# Patient Record
Sex: MALE | Race: WHITE | Employment: FULL TIME | URBAN - METROPOLITAN AREA
[De-identification: names, ages, dates, MRNs, and addresses within clinical notes are randomized per-mention and may not be internally consistent; named-entity substitution may affect disease eponyms.]

---

## 2017-07-19 ENCOUNTER — LAB ONLY (OUTPATIENT)
Dept: INTERNAL MEDICINE CLINIC | Age: 62
End: 2017-07-19

## 2017-07-19 DIAGNOSIS — E11.9 TYPE 2 DIABETES MELLITUS WITHOUT COMPLICATION, WITHOUT LONG-TERM CURRENT USE OF INSULIN (HCC): Primary | ICD-10-CM

## 2017-07-20 LAB
ALT SERPL-CCNC: 33 U/L (ref 5–40)
ANION GAP SERPL CALC-SCNC: 19 MMOL/L
AST SERPL W P-5'-P-CCNC: 25 U/L (ref 10–37)
AVG GLU, 10930: 159 MG/DL (ref 91–123)
BUN SERPL-MCNC: 17 MG/DL (ref 6–22)
CALCIUM SERPL-MCNC: 9.3 MG/DL (ref 8.4–10.4)
CHLORIDE SERPL-SCNC: 99 MMOL/L (ref 98–110)
CHOLEST SERPL-MCNC: 132 MG/DL (ref 110–200)
CO2 SERPL-SCNC: 20 MMOL/L (ref 20–32)
CREAT SERPL-MCNC: 0.7 MG/DL (ref 0.8–1.6)
GFRAA, 66117: >60
GFRNA, 66118: >60
GLUCOSE SERPL-MCNC: 164 MG/DL (ref 65–99)
HBA1C MFR BLD HPLC: 7.2 % (ref 4.8–5.9)
HDLC SERPL-MCNC: 32 MG/DL (ref 40–59)
LDLC SERPL CALC-MCNC: 83 MG/DL (ref 50–99)
POTASSIUM SERPL-SCNC: 4.3 MMOL/L (ref 3.5–5.5)
SODIUM SERPL-SCNC: 138 MMOL/L (ref 133–145)
TRIGL SERPL-MCNC: 88 MG/DL (ref 40–149)
VLDLC SERPL CALC-MCNC: 18 MG/DL (ref 8–30)

## 2017-07-28 ENCOUNTER — OFFICE VISIT (OUTPATIENT)
Dept: INTERNAL MEDICINE CLINIC | Age: 62
End: 2017-07-28

## 2017-07-28 VITALS
HEIGHT: 72 IN | DIASTOLIC BLOOD PRESSURE: 80 MMHG | OXYGEN SATURATION: 96 % | RESPIRATION RATE: 16 BRPM | WEIGHT: 262 LBS | TEMPERATURE: 97.2 F | HEART RATE: 92 BPM | SYSTOLIC BLOOD PRESSURE: 132 MMHG | BODY MASS INDEX: 35.49 KG/M2

## 2017-07-28 DIAGNOSIS — Z00.00 ROUTINE GENERAL MEDICAL EXAMINATION AT A HEALTH CARE FACILITY: Primary | ICD-10-CM

## 2017-07-28 DIAGNOSIS — E78.2 MIXED HYPERLIPIDEMIA: ICD-10-CM

## 2017-07-28 DIAGNOSIS — I10 ESSENTIAL HYPERTENSION: ICD-10-CM

## 2017-07-28 DIAGNOSIS — E11.9 TYPE 2 DIABETES MELLITUS WITHOUT COMPLICATION, WITHOUT LONG-TERM CURRENT USE OF INSULIN (HCC): ICD-10-CM

## 2017-07-28 NOTE — PATIENT INSTRUCTIONS

## 2017-07-28 NOTE — PROGRESS NOTES
HPI:   routine check up  Hx notable for metabolic syndrome  Recent A1C/chol 7.2/132  w/o chest pain/abd. discomfort; no dyspnea, cough or pedal edema; denies constitutional complaints of fever, night sweats or wt loss; no evidence of GI/ hemorrhage; no polyuria/polydipsia. Activity is age appropriate. ROS is otherwise negative. Past Medical History:   Diagnosis Date    Diabetes ([address]     Hypercholesterolemia     Hypertension     PVC's (premature ventricular contractions)        Past Surgical History:   Procedure Laterality Date    HX COLONOSCOPY  2007    neg       Social History     Social History    Marital status:      Spouse name: N/A    Number of children: N/A    Years of education: N/A     Occupational History    car sales      Social History Main Topics    Smoking status: Never Smoker    Smokeless tobacco: Never Used    Alcohol use 0.0 oz/week     0 Standard drinks or equivalent per week    Drug use: No    Sexual activity: Not on file     Other Topics Concern    Not on file     Social History Narrative       No Known Allergies    Family History   Problem Relation Age of Onset    Heart Disease Father        Current Outpatient Prescriptions   Medication Sig Dispense Refill    atorvastatin (LIPITOR) 40 mg tablet Take 1 Tab by mouth daily. 90 Tab 3    amLODIPine-benazepril (LOTREL) 10-40 mg per capsule Take 1 Cap by mouth daily. 90 Cap 4    fluocinoNIDE (LIDEX) 0.05 % topical cream Apply tid prn leg rash 60 g 1    sitaGLIPtin-metFORMIN 50-1,000 mg TM24 1 bid 180 Tab 0           Visit Vitals    /80 (BP 1 Location: Left arm, BP Patient Position: Sitting)    Pulse 92    Temp 97.2 °F (36.2 °C) (Tympanic)    Resp 16    Ht 6' (1.829 m)    Wt 262 lb (118.8 kg)    SpO2 96%    BMI 35.53 kg/m2       PE  Well nourished in NAD  HEENT:   OP: clear. Neck: supple w/o mass or bruits. Chest: clear. CV: RRR w/o m,r,g; pulses intact. Abd: soft, NT, w/o HSM or mass.   Rectal: heme neg; prostate 3 FBS and smooth  Ext: w/o edema. Neuro: NF. Assessment and Plan    Encounter Diagnoses   Name Primary?  Routine general medical examination at a health care facility Yes    Type 2 diabetes mellitus without complication, without long-term current use of insulin ([facility]     Essential hypertension     Mixed hyperlipidemia    HTN - controlled  T2DM/hyperlipidemia - continue dietary/exercise efforts  [place] advised; up to date with eye exams  I have reviewed/discussed the above normal BMI with the patient. I have recommended the following interventions: dietary management education, guidance, and counseling . Frutoso Jayesh     No change in rx  OV 4 mos or prn  I have explained plan to patient and the patient verbalizes understanding

## 2017-07-28 NOTE — PROGRESS NOTES
1. Have you been to the ER, urgent care clinic since your last visit? Hospitalized since your last visit? No    2. Have you seen or consulted any other health care providers outside of the [address] [place] [address] since your last visit? Include any pap smears or colon screening.  No

## 2018-05-03 ENCOUNTER — LAB ONLY (OUTPATIENT)
Dept: INTERNAL MEDICINE CLINIC | Age: 63
End: 2018-05-03

## 2018-05-03 DIAGNOSIS — Z00.00 ROUTINE GENERAL MEDICAL EXAMINATION AT A HEALTH CARE FACILITY: ICD-10-CM

## 2018-05-03 DIAGNOSIS — E11.9 TYPE 2 DIABETES MELLITUS WITHOUT COMPLICATION, WITHOUT LONG-TERM CURRENT USE OF INSULIN (HCC): Primary | ICD-10-CM

## 2018-05-04 LAB
A-G RATIO,AGRAT: 1.9 RATIO (ref 1.1–2.6)
ABSOLUTE LYMPHOCYTE COUNT, 10803: 1.8 K/UL (ref 1–4.8)
ALBUMIN SERPL-MCNC: 4.2 G/DL (ref 3.5–5)
ALP SERPL-CCNC: 96 U/L (ref 40–125)
ALT SERPL-CCNC: 33 U/L (ref 5–40)
ANION GAP SERPL CALC-SCNC: 17 MMOL/L
AST SERPL W P-5'-P-CCNC: 30 U/L (ref 10–37)
AVG GLU, 10930: 169 MG/DL (ref 91–123)
BASOPHILS # BLD: 0.1 K/UL (ref 0–0.2)
BASOPHILS NFR BLD: 1 % (ref 0–2)
BILIRUB SERPL-MCNC: 0.6 MG/DL (ref 0.2–1.2)
BUN SERPL-MCNC: 12 MG/DL (ref 6–22)
CALCIUM SERPL-MCNC: 8.9 MG/DL (ref 8.4–10.4)
CHLORIDE SERPL-SCNC: 104 MMOL/L (ref 98–110)
CHOLEST SERPL-MCNC: 139 MG/DL (ref 110–200)
CO2 SERPL-SCNC: 22 MMOL/L (ref 20–32)
CREAT SERPL-MCNC: 0.7 MG/DL (ref 0.8–1.6)
CREATININE, URINE: 236 MG/DL
EOSINOPHIL # BLD: 0.9 K/UL (ref 0–0.5)
EOSINOPHIL NFR BLD: 10 % (ref 0–6)
ERYTHROCYTE [DISTWIDTH] IN BLOOD BY AUTOMATED COUNT: 13.9 % (ref 10–15.5)
GFRAA, 66117: >60
GFRNA, 66118: >60
GLOBULIN,GLOB: 2.2 G/DL (ref 2–4)
GLUCOSE SERPL-MCNC: 163 MG/DL (ref 70–99)
GRANULOCYTES,GRANS: 62 % (ref 40–75)
HBA1C MFR BLD HPLC: 7.5 % (ref 4.8–5.9)
HCT VFR BLD AUTO: 44.7 % (ref 39.3–51.6)
HDLC SERPL-MCNC: 3.5 MG/DL (ref 0–5)
HDLC SERPL-MCNC: 40 MG/DL (ref 40–59)
HGB BLD-MCNC: 14.7 G/DL (ref 13.1–17.2)
LDLC SERPL CALC-MCNC: 79 MG/DL (ref 50–99)
LYMPHOCYTES, LYMLT: 20 % (ref 20–45)
MCH RBC QN AUTO: 29 PG (ref 26–34)
MCHC RBC AUTO-ENTMCNC: 33 G/DL (ref 31–36)
MCV RBC AUTO: 89 FL (ref 80–95)
MICROALB/CREAT RATIO, 140286: 7.9 MCG/MG OF CREATININE (ref 0–30)
MICROALBUMIN,URINE RANDOM 140054: 18.6 UG/ML (ref 0.1–17)
MONOCYTES # BLD: 0.7 K/UL (ref 0.1–1)
MONOCYTES NFR BLD: 8 % (ref 3–12)
NEUTROPHILS # BLD AUTO: 5.6 K/UL (ref 1.8–7.7)
PLATELET # BLD AUTO: 180 K/UL (ref 140–440)
PMV BLD AUTO: 11.2 FL (ref 9–13)
POTASSIUM SERPL-SCNC: 4.4 MMOL/L (ref 3.5–5.5)
PROT SERPL-MCNC: 6.4 G/DL (ref 6.2–8.1)
PSA SERPL-MCNC: 0.32 NG/ML
RBC # BLD AUTO: 5.02 M/UL (ref 3.8–5.8)
SODIUM SERPL-SCNC: 143 MMOL/L (ref 133–145)
TRIGL SERPL-MCNC: 100 MG/DL (ref 40–149)
VLDLC SERPL CALC-MCNC: 20 MG/DL (ref 8–30)
WBC # BLD AUTO: 9 K/UL (ref 4–11)

## 2018-05-24 ENCOUNTER — OFFICE VISIT (OUTPATIENT)
Dept: INTERNAL MEDICINE CLINIC | Age: 63
End: 2018-05-24

## 2018-05-24 VITALS
HEIGHT: 72 IN | RESPIRATION RATE: 16 BRPM | OXYGEN SATURATION: 97 % | TEMPERATURE: 98 F | HEART RATE: 88 BPM | WEIGHT: 268 LBS | DIASTOLIC BLOOD PRESSURE: 84 MMHG | SYSTOLIC BLOOD PRESSURE: 130 MMHG | BODY MASS INDEX: 36.3 KG/M2

## 2018-05-24 DIAGNOSIS — E78.2 MIXED HYPERLIPIDEMIA: ICD-10-CM

## 2018-05-24 DIAGNOSIS — I10 ESSENTIAL HYPERTENSION: ICD-10-CM

## 2018-05-24 DIAGNOSIS — E11.9 TYPE 2 DIABETES MELLITUS WITHOUT COMPLICATION, WITHOUT LONG-TERM CURRENT USE OF INSULIN (HCC): Primary | ICD-10-CM

## 2018-05-24 NOTE — PROGRESS NOTES
HPI:   F/u visit for routine evaluation of HTN, T2DM, hyperlipidemia  Recent A1C/chol 7.5/139  w/o chest pain/abd. discomfort; no dyspnea, cough or pedal edema; denies constitutional complaints of fever, night sweats or wt loss; no evidence of GI/ hemorrhage; no polyuria/polydipsia. Activity is age appropriate. ROS is otherwise negative. Past Medical History:   Diagnosis Date    Diabetes ([address]     Hypercholesterolemia     Hypertension     PVC's (premature ventricular contractions)        Past Surgical History:   Procedure Laterality Date    HX COLONOSCOPY  2007    neg       Social History     Social History    Marital status:      Spouse name: N/A    Number of children: N/A    Years of education: N/A     Occupational History    car sales      Social History Main Topics    Smoking status: Never Smoker    Smokeless tobacco: Never Used    Alcohol use 0.0 oz/week     0 Standard drinks or equivalent per week    Drug use: No    Sexual activity: Not on file     Other Topics Concern    Not on file     Social History Narrative       No Known Allergies    Family History   Problem Relation Age of Onset    Heart Disease Father        Current Outpatient Prescriptions   Medication Sig Dispense Refill    atorvastatin (LIPITOR) 40 mg tablet Take 1 Tab by mouth daily. 90 Tab 3    amLODIPine-benazepril (LOTREL) 10-40 mg per capsule Take 1 Cap by mouth daily. 90 Cap 4    fluocinoNIDE (LIDEX) 0.05 % topical cream Apply tid prn leg rash 60 g 1    sitaGLIPtin-metFORMIN 50-1,000 mg TM24 1 bid 180 Tab 0           Visit Vitals    /84 (BP 1 Location: Right arm, BP Patient Position: Sitting)    Pulse 88    Temp 98 °F (36.7 °C) (Tympanic)    Resp 16    Ht 6' (1.829 m)    Wt 268 lb (121.6 kg)    SpO2 97%    BMI 36.35 kg/m2       PE  Well nourished in NAD  HEENT:  OP: clear. Neck: supple w/o mass or bruits. Chest: clear. CV: RRR w/o m,r,g; pulses intact. Abd: soft, NT, w/o HSM or mass.   Ext: w/o edema. Neuro: NF. Assessment and Plan    Encounter Diagnoses   Name Primary?  Type 2 diabetes mellitus without complication, without long-term current use of insulin (HCC) Yes    Essential hypertension     Mixed hyperlipidemia    T2DM/hyperlipidemia - continue dietary/exercise efforts  HTN - controlled  No change in rx  OV 4 mos or prn  I have explained plan to patient and the patient verbalizes understanding      Discussed the patient's BMI with him. The BMI follow up plan is as follows:     dietary management education, guidance, and counseling  encourage exercise  monitor weight  prescribed dietary intake    An After Visit Summary was printed and given to the patient.

## 2018-05-24 NOTE — PROGRESS NOTES
1. Have you been to the ER, urgent care clinic since your last visit? Hospitalized since your last visit? Yes When: aug 17 Where: [facility] Reason for visit: toe fx    2. Have you seen or consulted any other health care providers outside of the [facility] since your last visit? Include any pap smears or colon screening.  No

## 2018-06-06 RX ORDER — ATORVASTATIN CALCIUM 40 MG/1
40 TABLET, FILM COATED ORAL DAILY
Qty: 90 TAB | Refills: 3 | Status: SHIPPED | OUTPATIENT
Start: 2018-06-06 | End: 2018-12-03 | Stop reason: SDUPTHER

## 2018-12-01 LAB
AVG GLU, 10930: 179 MG/DL (ref 91–123)
HBA1C MFR BLD HPLC: 7.9 % (ref 4.8–5.9)

## 2018-12-03 RX ORDER — ATORVASTATIN CALCIUM 40 MG/1
40 TABLET, FILM COATED ORAL DAILY
Qty: 90 TAB | Refills: 3 | Status: SHIPPED | OUTPATIENT
Start: 2018-12-03 | End: 2020-02-26 | Stop reason: SDUPTHER

## 2018-12-03 NOTE — TELEPHONE ENCOUNTER
Requested Prescriptions     Pending Prescriptions Disp Refills    atorvastatin (LIPITOR) 40 mg tablet 90 Tab 3     Sig: Take 1 Tab by mouth daily.      Pharmacy faxed request

## 2018-12-12 ENCOUNTER — OFFICE VISIT (OUTPATIENT)
Dept: INTERNAL MEDICINE CLINIC | Age: 63
End: 2018-12-12

## 2018-12-12 VITALS
OXYGEN SATURATION: 96 % | WEIGHT: 263 LBS | HEART RATE: 85 BPM | TEMPERATURE: 97.9 F | DIASTOLIC BLOOD PRESSURE: 80 MMHG | RESPIRATION RATE: 16 BRPM | SYSTOLIC BLOOD PRESSURE: 138 MMHG | HEIGHT: 72 IN | BODY MASS INDEX: 35.62 KG/M2

## 2018-12-12 DIAGNOSIS — E78.2 MIXED HYPERLIPIDEMIA: ICD-10-CM

## 2018-12-12 DIAGNOSIS — I10 ESSENTIAL HYPERTENSION: ICD-10-CM

## 2018-12-12 DIAGNOSIS — E11.9 TYPE 2 DIABETES MELLITUS WITHOUT COMPLICATION, WITHOUT LONG-TERM CURRENT USE OF INSULIN (HCC): ICD-10-CM

## 2018-12-12 DIAGNOSIS — Z00.00 ROUTINE GENERAL MEDICAL EXAMINATION AT A HEALTH CARE FACILITY: Primary | ICD-10-CM

## 2018-12-12 NOTE — PROGRESS NOTES
1. Have you been to the ER, urgent care clinic since your last visit? Hospitalized since your last visit? No    2. Have you seen or consulted any other health care providers outside of the [address] [place] [address] since your last visit? Include any pap smears or colon screening.  No

## 2018-12-12 NOTE — PATIENT INSTRUCTIONS
Learning About Diabetes Food Guidelines  Your Care Instructions    Meal planning is important to manage diabetes. It helps keep your blood sugar at a target level (which you set with your doctor). You don't have to eat special foods. You can eat what your family eats, including sweets once in a while. But you do have to pay attention to how often you eat and how much you eat of certain foods. You may want to work with a dietitian or a certified diabetes educator (CDE) to help you plan meals and snacks. A dietitian or CDE can also help you lose weight if that is one of your goals. What should you know about eating carbs? Managing the amount of carbohydrate (carbs) you eat is an important part of healthy meals when you have diabetes. Carbohydrate is found in many foods. · Learn which foods have carbs. And learn the amounts of carbs in different foods. ? Bread, cereal, pasta, and rice have about 15 grams of carbs in a serving. A serving is 1 slice of bread (1 ounce), ½ cup of cooked cereal, or 1/3 cup of cooked pasta or rice. ? Fruits have 15 grams of carbs in a serving. A serving is 1 small fresh fruit, such as an apple or orange; ½ of a banana; ½ cup of cooked or canned fruit; ½ cup of fruit juice; 1 cup of melon or raspberries; or 2 tablespoons of dried fruit. ? Milk and no-sugar-added yogurt have 15 grams of carbs in a serving. A serving is 1 cup of milk or 2/3 cup of no-sugar-added yogurt. ? Starchy vegetables have 15 grams of carbs in a serving. A serving is ½ cup of mashed potatoes or sweet potato; 1 cup winter squash; ½ of a small baked potato; ½ cup of cooked beans; or ½ cup cooked corn or green peas. · Learn how much carbs to eat each day and at each meal. A dietitian or CDE can teach you how to keep track of the amount of carbs you eat. This is called carbohydrate counting. · If you are not sure how to count carbohydrate grams, use the Plate Method to plan meals.  It is a good, quick way to make sure that you have a balanced meal. It also helps you spread carbs throughout the day. ? Divide your plate by types of foods. Put non-starchy vegetables on half the plate, meat or other protein food on one-quarter of the plate, and a grain or starchy vegetable in the final quarter of the plate. To this you can add a small piece of fruit and 1 cup of milk or yogurt, depending on how many carbs you are supposed to eat at a meal.  · Try to eat about the same amount of carbs at each meal. Do not \"save up\" your daily allowance of carbs to eat at one meal.  · Proteins have very little or no carbs per serving. Examples of proteins are beef, chicken, turkey, fish, eggs, tofu, cheese, cottage cheese, and peanut butter. A serving size of meat is 3 ounces, which is about the size of a deck of cards. Examples of meat substitute serving sizes (equal to 1 ounce of meat) are 1/4 cup of cottage cheese, 1 egg, 1 tablespoon of peanut butter, and ½ cup of tofu. How can you eat out and still eat healthy? · Learn to estimate the serving sizes of foods that have carbohydrate. If you measure food at home, it will be easier to estimate the amount in a serving of restaurant food. · If the meal you order has too much carbohydrate (such as potatoes, corn, or baked beans), ask to have a low-carbohydrate food instead. Ask for a salad or green vegetables. · If you use insulin, check your blood sugar before and after eating out to help you plan how much to eat in the future. · If you eat more carbohydrate at a meal than you had planned, take a walk or do other exercise. This will help lower your blood sugar. What else should you know? · Limit saturated fat, such as the fat from meat and dairy products. This is a healthy choice because people who have diabetes are at higher risk of heart disease. So choose lean cuts of meat and nonfat or low-fat dairy products.  Use olive or canola oil instead of butter or shortening when cooking. · Don't skip meals. Your blood sugar may drop too low if you skip meals and take insulin or certain medicines for diabetes. · Check with your doctor before you drink alcohol. Alcohol can cause your blood sugar to drop too low. Alcohol can also cause a bad reaction if you take certain diabetes medicines. Follow-up care is a key part of your treatment and safety. Be sure to make and go to all appointments, and call your doctor if you are having problems. It's also a good idea to know your test results and keep a list of the medicines you take. Where can you learn more? Go to http://george-nisha.info/. Enter E015 in the search box to learn more about \"Learning About Diabetes Food Guidelines. \"  Current as of: December 7, 2017  Content Version: 11.8  © 6218-9343 [employer]. Care instructions adapted under license by [employer] (which disclaims liability or warranty for this information). If you have questions about a medical condition or this instruction, always ask your healthcare professional. [address] any warranty or liability for your use of this information. Body Mass Index: Care Instructions  Your Care Instructions    Body mass index (BMI) can help you see if your weight is raising your risk for health problems. It uses a formula to compare how much you weigh with how tall you are. · A BMI lower than 18.5 is considered underweight. · A BMI between 18.5 and 24.9 is considered healthy. · A BMI between 25 and 29.9 is considered overweight. A BMI of 30 or higher is considered obese. If your BMI is in the normal range, it means that you have a lower risk for weight-related health problems. If your BMI is in the overweight or obese range, you may be at increased risk for weight-related health problems, such as high blood pressure, heart disease, stroke, arthritis or joint pain, and diabetes.  If your BMI is in the underweight range, you may be at increased risk for health problems such as fatigue, lower protection (immunity) against illness, muscle loss, bone loss, hair loss, and hormone problems. BMI is just one measure of your risk for weight-related health problems. You may be at higher risk for health problems if you are not active, you eat an unhealthy diet, or you drink too much alcohol or use tobacco products. Follow-up care is a key part of your treatment and safety. Be sure to make and go to all appointments, and call your doctor if you are having problems. It's also a good idea to know your test results and keep a list of the medicines you take. How can you care for yourself at home? · Practice healthy eating habits. This includes eating plenty of fruits, vegetables, whole grains, lean protein, and low-fat dairy. · If your doctor recommends it, get more exercise. Walking is a good choice. Bit by bit, increase the amount you walk every day. Try for at least 30 minutes on most days of the week. · Do not smoke. Smoking can increase your risk for health problems. If you need help quitting, talk to your doctor about stop-smoking programs and medicines. These can increase your chances of quitting for good. · Limit alcohol to 2 drinks a day for men and 1 drink a day for women. Too much alcohol can cause health problems. If you have a BMI higher than 25  · Your doctor may do other tests to check your risk for weight-related health problems. This may include measuring the distance around your waist. A waist measurement of more than 40 inches in men or 35 inches in women can increase the risk of weight-related health problems. · Talk with your doctor about steps you can take to stay healthy or improve your health. You may need to make lifestyle changes to lose weight and stay healthy, such as changing your diet and getting regular exercise.   If you have a BMI lower than 18.5  · Your doctor may do other tests to check your risk for health problems. · Talk with your doctor about steps you can take to stay healthy or improve your health. You may need to make lifestyle changes to gain or maintain weight and stay healthy, such as getting more healthy foods in your diet and doing exercises to build muscle. Where can you learn more? Go to http://george-nisha.info/. Enter S176 in the search box to learn more about \"Body Mass Index: Care Instructions. \"  Current as of: October 13, 2016  Content Version: 11.4  © 4846-3233 [employer] Care instructions adapted under license by [employer] (which disclaims liability or warranty for this information). If you have questions about a medical condition or this instruction, always ask your healthcare professional. [address] any warranty or liability for your use of this information.

## 2018-12-12 NOTE — PROGRESS NOTES
HPI:   Check up  F/u visit for routine evaluation of HTN, T2DM, hyperlipidemia  Recent A1C/chol 7.9/133  w/o chest pain/abd. discomfort; no dyspnea, cough or pedal edema; denies constitutional complaints of fever, night sweats or wt loss; no evidence of GI/ hemorrhage; no polyuria/polydipsia. Activity is age appropriate. ROS is otherwise negative. Past Medical History:   Diagnosis Date    Diabetes ([address]     Hypercholesterolemia     Hypertension     PVC's (premature ventricular contractions)        Past Surgical History:   Procedure Laterality Date    HX COLONOSCOPY  2007    neg       Social History     Socioeconomic History    Marital status:      Spouse name: Not on file    Number of children: Not on file    Years of education: Not on file    Highest education level: Not on file   Social Needs    Financial resource strain: Not on file    Food insecurity - worry: Not on file    Food insecurity - inability: Not on file   [employer] needs - medical: Not on file   [employer] needs - non-medical: Not on file   Occupational History    Occupation: car sales   Tobacco Use    Smoking status: Never Smoker    Smokeless tobacco: Never Used   Substance and Sexual Activity    Alcohol use: Yes     Alcohol/week: 0.0 oz    Drug use: No    Sexual activity: Not on file   Other Topics Concern    Not on file   Social History Narrative    Not on file       No Known Allergies    Family History   Problem Relation Age of Onset    Heart Disease Father        Current Outpatient Medications   Medication Sig Dispense Refill    atorvastatin (LIPITOR) 40 mg tablet Take 1 Tab by mouth daily. 90 Tab 3    SITagliptin-metFORMIN 50-1,000 mg TM24 1 bid 180 Tab 1    amLODIPine-benazepril (LOTREL) 10-40 mg per capsule Take 1 Cap by mouth daily.  90 Cap 4    fluocinoNIDE (LIDEX) 0.05 % topical cream Apply tid prn leg rash 60 g 1           Visit Vitals  /80 (BP 1 Location: Left arm, BP Patient Position: Sitting)   Pulse 85   Temp 97.9 °F (36.6 °C) (Tympanic)   Resp 16   Ht 6' (1.829 m)   Wt 263 lb (119.3 kg)   SpO2 96%   BMI 35.67 kg/m²       PE  Well nourished in NAD  HEENT:   OP: clear. Neck: supple w/o mass or bruits. Chest: clear. CV: RRR w/o m,r,g; pulses intact. Abd: soft, NT, w/o HSM or mass. Rectal: heme neg; prostate 3 FBS and smooth  Ext: w/o edema. Neuro: NF. Assessment and Plan    Encounter Diagnoses   Name Primary?  Routine general medical examination at a health care facility Yes    Type 2 diabetes mellitus without complication, without long-term current use of insulin ([address]     Essential hypertension     Mixed hyperlipidemia        HTN - controlled  T2DM/hyperlipidemia - continue dietary/exercise efforts; flu vaccine advised  [place] recommended (last 2007)  No change in rx  OV 4 mos or prn  I have explained plan to patient and the patient verbalizes understanding      Discussed the patient's BMI with him. The BMI follow up plan is as follows:     dietary management education, guidance, and counseling  encourage exercise  monitor weight  prescribed dietary intake    An After Visit Summary was printed and given to the patient.

## 2019-01-14 RX ORDER — AMLODIPINE BESYLATE AND BENAZEPRIL HYDROCHLORIDE 10; 40 MG/1; MG/1
CAPSULE ORAL
Qty: 90 CAP | Refills: 3 | Status: SHIPPED | OUTPATIENT
Start: 2019-01-14 | End: 2020-01-09

## 2019-06-05 ENCOUNTER — TELEPHONE (OUTPATIENT)
Dept: FAMILY MEDICINE CLINIC | Age: 64
End: 2019-06-05

## 2019-06-05 DIAGNOSIS — E11.9 TYPE 2 DIABETES MELLITUS WITHOUT COMPLICATION, WITHOUT LONG-TERM CURRENT USE OF INSULIN (HCC): ICD-10-CM

## 2019-06-05 DIAGNOSIS — E11.9 TYPE 2 DIABETES MELLITUS WITHOUT COMPLICATION, WITHOUT LONG-TERM CURRENT USE OF INSULIN (HCC): Primary | ICD-10-CM

## 2019-06-05 NOTE — TELEPHONE ENCOUNTER
Patient walked in and would like to request Dr. Reynaldo Ordonez to place lab orders in the system so he can walk in Friday for labs before his appointment.

## 2019-06-06 NOTE — PROGRESS NOTES
HPI:   F/u visit for routine evaluation of HTN, T2DM, hyperlipidemia  A1C/chol 7.9/133 Nov 2018; most recent A1C 8.1  No acute issues  w/o chest pain/abd. discomfort; no dyspnea, cough or pedal edema; denies constitutional complaints of fever, night sweats or wt loss; no evidence of GI/ hemorrhage; no polyuria/polydipsia. Activity is age appropriate. ROS is otherwise negative. Past Medical History:   Diagnosis Date    Diabetes ([address]     Hypercholesterolemia     Hypertension     PVC's (premature ventricular contractions)        Past Surgical History:   Procedure Laterality Date    HX COLONOSCOPY  2007    neg       Social History     Socioeconomic History    Marital status:      Spouse name: Not on file    Number of children: Not on file    Years of education: Not on file    Highest education level: Not on file   Occupational History    Occupation: car sales   Social Needs    Financial resource strain: Not on file    Food insecurity:     Worry: Not on file     Inability: Not on file    Transportation needs:     Medical: Not on file     Non-medical: Not on file   Tobacco Use    Smoking status: Never Smoker    Smokeless tobacco: Never Used   Substance and Sexual Activity    Alcohol use:  Yes     Alcohol/week: 0.0 oz    Drug use: No    Sexual activity: Not on file   Lifestyle    Physical activity:     Days per week: Not on file     Minutes per session: Not on file    Stress: Not on file   Relationships    Social connections:     Talks on phone: Not on file     Gets together: Not on file     Attends [employer] service: Not on file     Active member of club or organization: Not on file     Attends meetings of clubs or organizations: Not on file     Relationship status: Not on file    Intimate partner violence:     Fear of current or ex partner: Not on file     Emotionally abused: Not on file     Physically abused: Not on file     Forced sexual activity: Not on file   Other Topics Concern  Not on file   Social History Narrative    Not on file       No Known Allergies    Family History   Problem Relation Age of Onset    Heart Disease Father        Current Outpatient Medications   Medication Sig Dispense Refill    fluocinoNIDE (LIDEX) 0.05 % topical cream Apply tid prn leg rash 60 g 1    SITagliptin-metFORMIN 50-1,000 mg TM24 1 bid 180 Tab 1    amLODIPine-benazepril (LOTREL) 10-40 mg per capsule TAKE 1 CAPSULE BY MOUTH DAILY. 90 Cap 3    atorvastatin (LIPITOR) 40 mg tablet Take 1 Tab by mouth daily. 90 Tab 3           Visit Vitals  /77   Pulse 92   Temp 98.1 °F (36.7 °C) (Oral)   Resp 18   Ht 6' (1.829 m)   Wt 266 lb 12.8 oz (121 kg)   SpO2 95%   BMI 36.18 kg/m²       PE  Well nourished in NAD  HEENT:  OP: clear. Neck: supple w/o mass or bruits. Chest: clear. CV: RRR w/o m,r,g; pulses intact. Abd: soft, NT, w/o HSM or mass. Ext: w/o edema. Neuro: NF. Assessment and Plan    Encounter Diagnoses   Name Primary?  Type 2 diabetes mellitus without complication, without long-term current use of insulin (HCC) Yes    Essential hypertension     Mixed hyperlipidemia        HTN - controlled  T2DM/hyperlipidemia - continue dietary/exercise efforts  No change in rx  OV 4-6 mos or prn  I have explained plan to patient and the patient verbalizes understanding      Discussed the patient's BMI with him. The BMI follow up plan is as follows:     dietary management education, guidance, and counseling  encourage exercise  monitor weight  prescribed dietary intake    An After Visit Summary was printed and given to the patient.

## 2019-06-15 LAB
A-G RATIO,AGRAT: 1.9 RATIO (ref 1.1–2.6)
ALBUMIN SERPL-MCNC: 4.1 G/DL (ref 3.5–5)
ALP SERPL-CCNC: 109 U/L (ref 40–125)
ALT SERPL-CCNC: 28 U/L (ref 5–40)
ANION GAP SERPL CALC-SCNC: 14 MMOL/L
AST SERPL W P-5'-P-CCNC: 18 U/L (ref 10–37)
AVG GLU, 10930: 185 MG/DL (ref 91–123)
BILIRUB SERPL-MCNC: 0.3 MG/DL (ref 0.2–1.2)
BUN SERPL-MCNC: 18 MG/DL (ref 6–22)
CALCIUM SERPL-MCNC: 8.9 MG/DL (ref 8.4–10.4)
CHLORIDE SERPL-SCNC: 109 MMOL/L (ref 98–110)
CHOLEST SERPL-MCNC: 134 MG/DL (ref 110–200)
CO2 SERPL-SCNC: 22 MMOL/L (ref 20–32)
CREAT SERPL-MCNC: 0.7 MG/DL (ref 0.8–1.6)
CREATININE, URINE: 227 MG/DL
GFRAA, 66117: >60
GFRNA, 66118: >60
GLOBULIN,GLOB: 2.2 G/DL (ref 2–4)
GLUCOSE SERPL-MCNC: 208 MG/DL (ref 70–99)
HBA1C MFR BLD HPLC: 8.1 % (ref 4.8–5.9)
HDLC SERPL-MCNC: 33 MG/DL (ref 40–59)
HDLC SERPL-MCNC: 4.1 MG/DL (ref 0–5)
LDLC SERPL CALC-MCNC: 70 MG/DL (ref 50–99)
MICROALB/CREAT RATIO, 140286: NORMAL MCG/MG OF CREATININE (ref 0–30)
MICROALBUMIN,URINE RANDOM 140054: <12 MCG/ML (ref 0.1–17)
POTASSIUM SERPL-SCNC: 4 MMOL/L (ref 3.5–5.5)
PROT SERPL-MCNC: 6.3 G/DL (ref 6.2–8.1)
SODIUM SERPL-SCNC: 145 MMOL/L (ref 133–145)
TRIGL SERPL-MCNC: 157 MG/DL (ref 40–149)
VLDLC SERPL CALC-MCNC: 31 MG/DL (ref 8–30)

## 2019-06-19 RX ORDER — FLUOCINONIDE 0.5 MG/G
CREAM TOPICAL
Qty: 60 G | Refills: 1 | Status: SHIPPED | OUTPATIENT
Start: 2019-06-19 | End: 2019-06-26 | Stop reason: SDUPTHER

## 2019-06-26 ENCOUNTER — OFFICE VISIT (OUTPATIENT)
Dept: FAMILY MEDICINE CLINIC | Age: 64
End: 2019-06-26

## 2019-06-26 VITALS
HEART RATE: 92 BPM | BODY MASS INDEX: 36.14 KG/M2 | DIASTOLIC BLOOD PRESSURE: 77 MMHG | WEIGHT: 266.8 LBS | OXYGEN SATURATION: 95 % | RESPIRATION RATE: 18 BRPM | HEIGHT: 72 IN | TEMPERATURE: 98.1 F | SYSTOLIC BLOOD PRESSURE: 124 MMHG

## 2019-06-26 DIAGNOSIS — I10 ESSENTIAL HYPERTENSION: ICD-10-CM

## 2019-06-26 DIAGNOSIS — E11.9 TYPE 2 DIABETES MELLITUS WITHOUT COMPLICATION, WITHOUT LONG-TERM CURRENT USE OF INSULIN (HCC): Primary | ICD-10-CM

## 2019-06-26 DIAGNOSIS — E78.2 MIXED HYPERLIPIDEMIA: ICD-10-CM

## 2019-06-26 RX ORDER — FLUOCINONIDE 0.5 MG/G
CREAM TOPICAL
Qty: 60 G | Refills: 3 | Status: SHIPPED | OUTPATIENT
Start: 2019-06-26

## 2019-06-26 NOTE — PROGRESS NOTES
Tasha Erwin is a  59 y.o. male presents today for office visit for routine follow up. 1. Have you been to the ER, urgent care clinic or hospitalized since your last visit? NO    2. Have you seen or consulted any other health care providers outside of the [address] [place] [address] since your last visit (Include any pap smears or colon screening)? NO

## 2019-06-26 NOTE — PATIENT INSTRUCTIONS
Learning About Diabetes Food Guidelines  Your Care Instructions    Meal planning is important to manage diabetes. It helps keep your blood sugar at a target level (which you set with your doctor). You don't have to eat special foods. You can eat what your family eats, including sweets once in a while. But you do have to pay attention to how often you eat and how much you eat of certain foods. You may want to work with a dietitian or a certified diabetes educator (CDE) to help you plan meals and snacks. A dietitian or CDE can also help you lose weight if that is one of your goals. What should you know about eating carbs? Managing the amount of carbohydrate (carbs) you eat is an important part of healthy meals when you have diabetes. Carbohydrate is found in many foods. · Learn which foods have carbs. And learn the amounts of carbs in different foods. ? Bread, cereal, pasta, and rice have about 15 grams of carbs in a serving. A serving is 1 slice of bread (1 ounce), ½ cup of cooked cereal, or 1/3 cup of cooked pasta or rice. ? Fruits have 15 grams of carbs in a serving. A serving is 1 small fresh fruit, such as an apple or orange; ½ of a banana; ½ cup of cooked or canned fruit; ½ cup of fruit juice; 1 cup of melon or raspberries; or 2 tablespoons of dried fruit. ? Milk and no-sugar-added yogurt have 15 grams of carbs in a serving. A serving is 1 cup of milk or 2/3 cup of no-sugar-added yogurt. ? Starchy vegetables have 15 grams of carbs in a serving. A serving is ½ cup of mashed potatoes or sweet potato; 1 cup winter squash; ½ of a small baked potato; ½ cup of cooked beans; or ½ cup cooked corn or green peas. · Learn how much carbs to eat each day and at each meal. A dietitian or CDE can teach you how to keep track of the amount of carbs you eat. This is called carbohydrate counting. · If you are not sure how to count carbohydrate grams, use the Plate Method to plan meals.  It is a good, quick way to make sure that you have a balanced meal. It also helps you spread carbs throughout the day. ? Divide your plate by types of foods. Put non-starchy vegetables on half the plate, meat or other protein food on one-quarter of the plate, and a grain or starchy vegetable in the final quarter of the plate. To this you can add a small piece of fruit and 1 cup of milk or yogurt, depending on how many carbs you are supposed to eat at a meal.  · Try to eat about the same amount of carbs at each meal. Do not \"save up\" your daily allowance of carbs to eat at one meal.  · Proteins have very little or no carbs per serving. Examples of proteins are beef, chicken, turkey, fish, eggs, tofu, cheese, cottage cheese, and peanut butter. A serving size of meat is 3 ounces, which is about the size of a deck of cards. Examples of meat substitute serving sizes (equal to 1 ounce of meat) are 1/4 cup of cottage cheese, 1 egg, 1 tablespoon of peanut butter, and ½ cup of tofu. How can you eat out and still eat healthy? · Learn to estimate the serving sizes of foods that have carbohydrate. If you measure food at home, it will be easier to estimate the amount in a serving of restaurant food. · If the meal you order has too much carbohydrate (such as potatoes, corn, or baked beans), ask to have a low-carbohydrate food instead. Ask for a salad or green vegetables. · If you use insulin, check your blood sugar before and after eating out to help you plan how much to eat in the future. · If you eat more carbohydrate at a meal than you had planned, take a walk or do other exercise. This will help lower your blood sugar. What else should you know? · Limit saturated fat, such as the fat from meat and dairy products. This is a healthy choice because people who have diabetes are at higher risk of heart disease. So choose lean cuts of meat and nonfat or low-fat dairy products.  Use olive or canola oil instead of butter or shortening when cooking. · Don't skip meals. Your blood sugar may drop too low if you skip meals and take insulin or certain medicines for diabetes. · Check with your doctor before you drink alcohol. Alcohol can cause your blood sugar to drop too low. Alcohol can also cause a bad reaction if you take certain diabetes medicines. Follow-up care is a key part of your treatment and safety. Be sure to make and go to all appointments, and call your doctor if you are having problems. It's also a good idea to know your test results and keep a list of the medicines you take. Where can you learn more? Go to http://george-nisha.info/. Enter V710 in the search box to learn more about \"Learning About Diabetes Food Guidelines. \"  Current as of: July 25, 2018  Content Version: 11.9  © 5562-0876 [employer]. Care instructions adapted under license by [employer] (which disclaims liability or warranty for this information). If you have questions about a medical condition or this instruction, always ask your healthcare professional. [address] any warranty or liability for your use of this information. Body Mass Index: Care Instructions  Your Care Instructions    Body mass index (BMI) can help you see if your weight is raising your risk for health problems. It uses a formula to compare how much you weigh with how tall you are. · A BMI lower than 18.5 is considered underweight. · A BMI between 18.5 and 24.9 is considered healthy. · A BMI between 25 and 29.9 is considered overweight. A BMI of 30 or higher is considered obese. If your BMI is in the normal range, it means that you have a lower risk for weight-related health problems. If your BMI is in the overweight or obese range, you may be at increased risk for weight-related health problems, such as high blood pressure, heart disease, stroke, arthritis or joint pain, and diabetes.  If your BMI is in the underweight range, you may be at increased risk for health problems such as fatigue, lower protection (immunity) against illness, muscle loss, bone loss, hair loss, and hormone problems. BMI is just one measure of your risk for weight-related health problems. You may be at higher risk for health problems if you are not active, you eat an unhealthy diet, or you drink too much alcohol or use tobacco products. Follow-up care is a key part of your treatment and safety. Be sure to make and go to all appointments, and call your doctor if you are having problems. It's also a good idea to know your test results and keep a list of the medicines you take. How can you care for yourself at home? · Practice healthy eating habits. This includes eating plenty of fruits, vegetables, whole grains, lean protein, and low-fat dairy. · If your doctor recommends it, get more exercise. Walking is a good choice. Bit by bit, increase the amount you walk every day. Try for at least 30 minutes on most days of the week. · Do not smoke. Smoking can increase your risk for health problems. If you need help quitting, talk to your doctor about stop-smoking programs and medicines. These can increase your chances of quitting for good. · Limit alcohol to 2 drinks a day for men and 1 drink a day for women. Too much alcohol can cause health problems. If you have a BMI higher than 25  · Your doctor may do other tests to check your risk for weight-related health problems. This may include measuring the distance around your waist. A waist measurement of more than 40 inches in men or 35 inches in women can increase the risk of weight-related health problems. · Talk with your doctor about steps you can take to stay healthy or improve your health. You may need to make lifestyle changes to lose weight and stay healthy, such as changing your diet and getting regular exercise.   If you have a BMI lower than 18.5  · Your doctor may do other tests to check your risk for health problems. · Talk with your doctor about steps you can take to stay healthy or improve your health. You may need to make lifestyle changes to gain or maintain weight and stay healthy, such as getting more healthy foods in your diet and doing exercises to build muscle. Where can you learn more? Go to http://george-nisha.info/. Enter S176 in the search box to learn more about \"Body Mass Index: Care Instructions. \"  Current as of: October 13, 2016  Content Version: 11.4  © 4230-8542 [employer]. Care instructions adapted under license by [employer] (which disclaims liability or warranty for this information). If you have questions about a medical condition or this instruction, always ask your healthcare professional. [address] any warranty or liability for your use of this information.

## 2019-08-05 NOTE — PROGRESS NOTES
HPI:   Pt with metabolic syndrome presents with worsening plaque like rash primarily thorax which he has had x 5 years; partially improved with topical steroid  Now having increasing arthritic pain (R) hip and (B) hands x 4-6 mos and wonders if d/t psoriatic arthritis      ROS is otherwise negative. Past Medical History:   Diagnosis Date    Diabetes ([facility] [address]     Hypercholesterolemia     Hypertension     PVC's (premature ventricular contractions)        Past Surgical History:   Procedure Laterality Date    HX COLONOSCOPY  2007    neg       Social History     Socioeconomic History    Marital status:      Spouse name: Not on file    Number of children: Not on file    Years of education: Not on file    Highest education level: Not on file   Occupational History    Occupation: car sales   Social Needs    Financial resource strain: Not on file    Food insecurity:     Worry: Not on file     Inability: Not on file    Transportation needs:     Medical: Not on file     Non-medical: Not on file   Tobacco Use    Smoking status: Never Smoker    Smokeless tobacco: Never Used   Substance and Sexual Activity    Alcohol use:  Yes     Alcohol/week: 0.0 standard drinks    Drug use: No    Sexual activity: Not on file   Lifestyle    Physical activity:     Days per week: Not on file     Minutes per session: Not on file    Stress: Not on file   Relationships    Social connections:     Talks on phone: Not on file     Gets together: Not on file     Attends [employer] service: Not on file     Active member of club or organization: Not on file     Attends meetings of clubs or organizations: Not on file     Relationship status: Not on file    Intimate partner violence:     Fear of current or ex partner: Not on file     Emotionally abused: Not on file     Physically abused: Not on file     Forced sexual activity: Not on file   Other Topics Concern    Not on file   Social History Narrative    Not on file       No Known Allergies    Family History   Problem Relation Age of Onset    Heart Disease Father        Current Outpatient Medications   Medication Sig Dispense Refill    fluocinoNIDE (LIDEX) 0.05 % topical cream Apply bid prn leg rash 60 g 3    SITagliptin-metFORMIN 50-1,000 mg TM24 1 bid 180 Tab 1    amLODIPine-benazepril (LOTREL) 10-40 mg per capsule TAKE 1 CAPSULE BY MOUTH DAILY. 90 Cap 3    atorvastatin (LIPITOR) 40 mg tablet Take 1 Tab by mouth daily. 90 Tab 3           Visit Vitals  /80 (BP 1 Location: Right arm, BP Patient Position: Sitting)   Pulse 81   Temp 98.3 °F (36.8 °C) (Tympanic)   Resp 16   Ht 6' (1.829 m)   Wt 260 lb (117.9 kg)   SpO2 98%   BMI 35.26 kg/m²       PE  Well nourished in NAD  HEENT:   OP: clear. Neck: supple w/o mass or bruits. Chest: clear. CV: RRR w/o m,r,g; pulses intact. Abd: soft, NT, w/o HSM or mass. Ext: w/o edema. MSK: no synovitis  Skin: scattered plaques thorax  Neuro: NF. Assessment and Plan    Encounter Diagnoses   Name Primary?  Rash Yes    Arthritis     Metabolic syndrome      Psoriasis with possibility of psoriatic arthritis  To derm/rheum as pt requests  He may benefit from biologic rx  Metabolic syndrome - continue dietary/exercise efforts  OV 3 mos or prn  I have explained plan to patient and the patient verbalizes understanding      Discussed the patient's BMI with him. The BMI follow up plan is as follows:     dietary management education, guidance, and counseling  encourage exercise  monitor weight  prescribed dietary intake    An After Visit Summary was printed and given to the patient.

## 2019-08-07 ENCOUNTER — OFFICE VISIT (OUTPATIENT)
Dept: FAMILY MEDICINE CLINIC | Age: 64
End: 2019-08-07

## 2019-08-07 VITALS
RESPIRATION RATE: 16 BRPM | DIASTOLIC BLOOD PRESSURE: 80 MMHG | TEMPERATURE: 98.3 F | HEIGHT: 72 IN | WEIGHT: 260 LBS | HEART RATE: 81 BPM | OXYGEN SATURATION: 98 % | BODY MASS INDEX: 35.21 KG/M2 | SYSTOLIC BLOOD PRESSURE: 138 MMHG

## 2019-08-07 DIAGNOSIS — M19.90 ARTHRITIS: ICD-10-CM

## 2019-08-07 DIAGNOSIS — E88.81 METABOLIC SYNDROME: ICD-10-CM

## 2019-08-07 DIAGNOSIS — R21 RASH: Primary | ICD-10-CM

## 2019-08-07 NOTE — PATIENT INSTRUCTIONS
Body Mass Index: Care Instructions  Your Care Instructions    Body mass index (BMI) can help you see if your weight is raising your risk for health problems. It uses a formula to compare how much you weigh with how tall you are. · A BMI lower than 18.5 is considered underweight. · A BMI between 18.5 and 24.9 is considered healthy. · A BMI between 25 and 29.9 is considered overweight. A BMI of 30 or higher is considered obese. If your BMI is in the normal range, it means that you have a lower risk for weight-related health problems. If your BMI is in the overweight or obese range, you may be at increased risk for weight-related health problems, such as high blood pressure, heart disease, stroke, arthritis or joint pain, and diabetes. If your BMI is in the underweight range, you may be at increased risk for health problems such as fatigue, lower protection (immunity) against illness, muscle loss, bone loss, hair loss, and hormone problems. BMI is just one measure of your risk for weight-related health problems. You may be at higher risk for health problems if you are not active, you eat an unhealthy diet, or you drink too much alcohol or use tobacco products. Follow-up care is a key part of your treatment and safety. Be sure to make and go to all appointments, and call your doctor if you are having problems. It's also a good idea to know your test results and keep a list of the medicines you take. How can you care for yourself at home? · Practice healthy eating habits. This includes eating plenty of fruits, vegetables, whole grains, lean protein, and low-fat dairy. · If your doctor recommends it, get more exercise. Walking is a good choice. Bit by bit, increase the amount you walk every day. Try for at least 30 minutes on most days of the week. · Do not smoke. Smoking can increase your risk for health problems. If you need help quitting, talk to your doctor about stop-smoking programs and medicines. These can increase your chances of quitting for good. · Limit alcohol to 2 drinks a day for men and 1 drink a day for women. Too much alcohol can cause health problems. If you have a BMI higher than 25  · Your doctor may do other tests to check your risk for weight-related health problems. This may include measuring the distance around your waist. A waist measurement of more than 40 inches in men or 35 inches in women can increase the risk of weight-related health problems. · Talk with your doctor about steps you can take to stay healthy or improve your health. You may need to make lifestyle changes to lose weight and stay healthy, such as changing your diet and getting regular exercise. If you have a BMI lower than 18.5  · Your doctor may do other tests to check your risk for health problems. · Talk with your doctor about steps you can take to stay healthy or improve your health. You may need to make lifestyle changes to gain or maintain weight and stay healthy, such as getting more healthy foods in your diet and doing exercises to build muscle. Where can you learn more? Go to http://george-nisha.info/. Enter S176 in the search box to learn more about \"Body Mass Index: Care Instructions. \"  Current as of: October 13, 2016  Content Version: 11.4  © 2060-1249 [employer]. Care instructions adapted under license by [employer] (which disclaims liability or warranty for this information). If you have questions about a medical condition or this instruction, always ask your healthcare professional. [place] [address] any warranty or liability for your use of this information. Diet and Exercise for Metabolic Syndrome: Care Instructions  Your Care Instructions    Metabolic syndrome is the name for a group of health problems. It includes having too much fat around your waist and high blood pressure.  It also includes high triglycerides, high blood sugar, and low levels of healthy (HDL) cholesterol. These problems make it more likely you will have a heart attack or stroke or get diabetes. Your family history (your genes) can cause metabolic syndrome. So can unhealthy eating habits and not getting enough exercise. You can help lower your risk of heart attack, stroke, and diabetes if you eat healthy foods and get more exercise. It may be hard to make these lifestyle changes. But even small changes can help. Follow-up care is a key part of your treatment and safety. Be sure to make and go to all appointments, and call your doctor if you are having problems. It's also a good idea to know your test results and keep a list of the medicines you take. How can you care for yourself at home? Eat more fruits and vegetables  · Fruits and vegetables have nutrients to help protect you from heart disease and high blood pressure. They are low in fat and high in fiber. Dark green, orange, and yellow ones are the healthiest.  · Keep lots of vegetables ready for snacks. · Buy fruit that is in season. Then put it where you can see it so you will want to eat it. · Cook dishes that have a lot of vegetables. Soups and stir-fries are good choices. Limit saturated and trans fats  · Read food labels, and try to avoid saturated and trans fats. They increase your risk of heart disease. · Use olive or canola oil when you cook. · Bake, broil, grill, or steam foods. Avoid fried foods. · Limit how much high-fat meat you eat. This includes hot dogs and sausages. When you prepare meat, cut off all the fat. · You can replace high-fat meat with fish and skinless poultry. You can also try products made from soybeans, like tofu. Soybeans may be very good for your heart. · Eat at least 2 servings of fish a week. Fish with omega-3 fatty acids may help reduce your risk of getting coronary artery disease. [place] and sardines are good examples.   · Choose low-fat or fat-free milk and dairy products. Eat foods high in fiber  · Foods high in fiber may reduce your cholesterol. And they may give you important vitamins and minerals. Good examples are oatmeal, cooked dried beans, brown rice, citrus fruits, and apples. · Eat whole-grain breads and cereals. They have more fiber than white bread or pastries. Limit high-sugar foods  · Limit foods and drinks that are high in sugar. Some examples are soda pop, sugar-sweetened fruit drinks, candy, and many desserts. · Limit the amount of sugar, honey, and other sweeteners that you add to food and drinks. · Choose water instead of soda pop or other sugar-sweetened drinks. · Limit fruit juice. Limit salt and sodium  · You can help lower your blood pressure if you limit salt and sodium. · If you take the salt shaker off the table, it may be easier to use less salt. You can also try using half the salt in a recipe. And you can avoid adding salt to cooking water for pasta, rice, and potatoes. · Try to eat fewer snacks, fast foods, and other high-salt, processed foods. Check labels so you know how much sodium a food has. · Choose canned goods (soups, vegetables, and beans) that are low in sodium. Get regular exercise  · Get more exercise. Make sure your doctor knows when you start a new exercise program. Even small amounts of exercise will help you get stronger and have more energy. It can also help you manage your weight and your stress. · Walking is a good choice. Little by little, increase the amount you walk every day. Try for at least 30 minutes on most days of the week. Where can you learn more? Go to http://george-nisha.info/. Enter 959 5947 in the search box to learn more about \"Diet and Exercise for Metabolic Syndrome: Care Instructions. \"  Current as of: November 7, 2018  Content Version: 12.1  © 7791-5026 Healthwise[employer] Inc[employer].  Care instructions adapted under license by [employer] (which disclaims liability or warranty for this information). If you have questions about a medical condition or this instruction, always ask your healthcare professional. [place] [address] any warranty or liability for your use of this information.

## 2019-08-07 NOTE — PROGRESS NOTES
Chief Complaint   Patient presents with    Joint Pain       Pt preferred language for health care discussion is english. Is someone accompanying this pt? no    Is the patient using any DME equipment during OV? no    Depression Screening:  3 most recent PHQ Screens 12/12/2018 7/28/2017 12/3/2015   Little interest or pleasure in doing things Not at all Not at all Not at all   Feeling down, depressed, irritable, or hopeless Not at all Not at all Not at all   Total Score PHQ 2 0 0 0       Learning Assessment:  Learning Assessment 5/26/2016   PRIMARY LEARNER Patient   HIGHEST LEVEL OF EDUCATION - PRIMARY LEARNER  4 YEARS OF COLLEGE   BARRIERS PRIMARY LEARNER NONE   CO-LEARNER CAREGIVER No   PRIMARY LANGUAGE ENGLISH   LEARNER PREFERENCE PRIMARY READING   ANSWERED BY patient   RELATIONSHIP SELF         Health Maintenance reviewed and discussed per provider. Yes        Advance Directive:  1. Do you have an advance directive in place? Patient Reply:no    2. If not, would you like material regarding how to put one in place? Patient Reply: no    Coordination of Care:  1. Have you been to the ER, urgent care clinic since your last visit? Hospitalized since your last visit? Yes urgent care rash and joint pain    2. Have you seen or consulted any other health care providers outside of the [address] [place] [address] since your last visit? Include any pap smears or colon screening.  no    Provider prefers to do his own med reconciliation

## 2019-08-19 ENCOUNTER — TELEPHONE (OUTPATIENT)
Dept: FAMILY MEDICINE CLINIC | Age: 64
End: 2019-08-19

## 2019-08-20 NOTE — TELEPHONE ENCOUNTER
Patient called back to the office. Confirmed that his referrals to dermatology and Dr. Froilan Chahal have been faxed over and he can call to schedule appointment if he doesn't wish to wait on their phone call.    Patient verbalized understanding

## 2019-08-26 ENCOUNTER — TELEPHONE (OUTPATIENT)
Dept: FAMILY MEDICINE CLINIC | Age: 64
End: 2019-08-26

## 2019-09-05 ENCOUNTER — OFFICE VISIT (OUTPATIENT)
Dept: FAMILY MEDICINE CLINIC | Age: 64
End: 2019-09-05

## 2019-09-05 VITALS
RESPIRATION RATE: 16 BRPM | HEIGHT: 72 IN | HEART RATE: 90 BPM | BODY MASS INDEX: 34.95 KG/M2 | TEMPERATURE: 97.8 F | OXYGEN SATURATION: 96 % | DIASTOLIC BLOOD PRESSURE: 80 MMHG | WEIGHT: 258 LBS | SYSTOLIC BLOOD PRESSURE: 138 MMHG

## 2019-09-05 DIAGNOSIS — E11.9 TYPE 2 DIABETES MELLITUS WITHOUT COMPLICATION, WITHOUT LONG-TERM CURRENT USE OF INSULIN (HCC): ICD-10-CM

## 2019-09-05 DIAGNOSIS — E11.9 TYPE 2 DIABETES MELLITUS WITHOUT COMPLICATION, WITHOUT LONG-TERM CURRENT USE OF INSULIN (HCC): Primary | ICD-10-CM

## 2019-09-05 DIAGNOSIS — M35.3 PMR (POLYMYALGIA RHEUMATICA) (HCC): ICD-10-CM

## 2019-09-05 DIAGNOSIS — M35.3 PMR (POLYMYALGIA RHEUMATICA) (HCC): Primary | ICD-10-CM

## 2019-09-05 RX ORDER — OMEPRAZOLE 20 MG/1
20 CAPSULE, DELAYED RELEASE ORAL DAILY
Qty: 90 CAP | Refills: 3 | Status: SHIPPED | OUTPATIENT
Start: 2019-09-05 | End: 2020-08-31 | Stop reason: SDUPTHER

## 2019-09-05 RX ORDER — PREDNISONE 20 MG/1
20 TABLET ORAL
Qty: 60 TAB | Refills: 1 | Status: SHIPPED | OUTPATIENT
Start: 2019-09-05 | End: 2019-10-17 | Stop reason: SDUPTHER

## 2019-09-05 NOTE — PROGRESS NOTES
Chief Complaint   Patient presents with    Pain (Chronic)       Pt preferred language for health care discussion is english. Is someone accompanying this pt? no    Is the patient using any DME equipment during OV? no    Depression Screening:  3 most recent PHQ Screens 12/12/2018 7/28/2017 12/3/2015   Little interest or pleasure in doing things Not at all Not at all Not at all   Feeling down, depressed, irritable, or hopeless Not at all Not at all Not at all   Total Score PHQ 2 0 0 0       Learning Assessment:  Learning Assessment 5/26/2016   PRIMARY LEARNER Patient   HIGHEST LEVEL OF EDUCATION - PRIMARY LEARNER  4 YEARS OF COLLEGE   BARRIERS PRIMARY LEARNER NONE   CO-LEARNER CAREGIVER No   PRIMARY LANGUAGE ENGLISH   LEARNER PREFERENCE PRIMARY READING   ANSWERED BY patient   RELATIONSHIP SELF         Health Maintenance reviewed and discussed per provider. Yes        Advance Directive:  1. Do you have an advance directive in place? Patient Reply:no    2. If not, would you like material regarding how to put one in place? Patient Reply: no    Coordination of Care:  1. Have you been to the ER, urgent care clinic since your last visit? Hospitalized since your last visit[facility]    2. Have you seen or consulted any other health care providers outside of the [facility] since your last visit? Include any pap smears or colon screening.  no    Provider prefers to do his own med reconciliation

## 2019-09-05 NOTE — PROGRESS NOTES
HPI:   Pt with metabolic syndrome presents for f/u of worsening arthritic pain shoulders/hips w/o synovitis of hands x 3 mos  Pain dramatically relieved by 24 hrs of prednisone; when he stops a 5 to 7  day taper, pain immediately returns  Has been given several courses of prednisone by [facility]  Pt has consults with derm/rheum with worry arthritis d/t psoriasis  Currently w/o chest pain/abd. discomfort; no dyspnea, cough or increased pedal edema; denies constitutional complaints of fever, night sweats or wt loss; no evidence of GI/ hemorrhage    ROS is otherwise negative. Past Medical History:   Diagnosis Date    Diabetes ([facility] [address]     Hypercholesterolemia     Hypertension     Psoriasis     PVC's (premature ventricular contractions)        Past Surgical History:   Procedure Laterality Date    HX COLONOSCOPY  2007    neg       Social History     Socioeconomic History    Marital status:      Spouse name: Not on file    Number of children: Not on file    Years of education: Not on file    Highest education level: Not on file   Occupational History    Occupation: car sales   Social Needs    Financial resource strain: Not on file    Food insecurity:     Worry: Not on file     Inability: Not on file    Transportation needs:     Medical: Not on file     Non-medical: Not on file   Tobacco Use    Smoking status: Never Smoker    Smokeless tobacco: Never Used   Substance and Sexual Activity    Alcohol use:  Yes     Alcohol/week: 0.0 standard drinks    Drug use: No    Sexual activity: Not on file   Lifestyle    Physical activity:     Days per week: Not on file     Minutes per session: Not on file    Stress: Not on file   Relationships    Social connections:     Talks on phone: Not on file     Gets together: Not on file     Attends Gnosticism service: Not on file     Active member of club or organization: Not on file     Attends meetings of clubs or organizations: Not on file     Relationship status: Not on file    Intimate partner violence:     Fear of current or ex partner: Not on file     Emotionally abused: Not on file     Physically abused: Not on file     Forced sexual activity: Not on file   Other Topics Concern    Not on file   Social History Narrative    Not on file       No Known Allergies    Family History   Problem Relation Age of Onset    Heart Disease Father        Current Outpatient Medications   Medication Sig Dispense Refill    predniSONE (DELTASONE) 20 mg tablet Take 20 mg by mouth daily (with breakfast). 60 Tab 1    omeprazole (PRILOSEC) 20 mg capsule Take 1 Cap by mouth daily. 90 Cap 3    fluocinoNIDE (LIDEX) 0.05 % topical cream Apply bid prn leg rash 60 g 3    SITagliptin-metFORMIN 50-1,000 mg TM24 1 bid 180 Tab 1    amLODIPine-benazepril (LOTREL) 10-40 mg per capsule TAKE 1 CAPSULE BY MOUTH DAILY. 90 Cap 3    atorvastatin (LIPITOR) 40 mg tablet Take 1 Tab by mouth daily. 90 Tab 3           Visit Vitals  /80 (BP 1 Location: Right arm, BP Patient Position: Sitting)   Pulse 90   Temp 97.8 °F (36.6 °C) (Tympanic)   Resp 16   Ht 6' (1.829 m)   Wt 258 lb (117 kg)   SpO2 96%   BMI 34.99 kg/m²       PE  Well nourished in NAD  HEENT:   OP: clear. Neck: supple w/o mass or bruits. Chest: clear. CV: RRR w/o m,r,g; pulses intact. Abd: soft, NT, w/o HSM or mass. Ext: tr edema. MSK: no synovitis of small joints  Neuro: NF. Assessment and Plan    Encounter Diagnoses   Name Primary?     PMR (polymyalgia rheumatica) ([facility]) Yes       Probable PMR (based on dramatic quick response with complete pain relief with steroids and rapid return of pain with stopping steroids)  Prednisone 20 mg every day (gradual taper over 12-18 mos); prilosec 20 mg qd  Has derm/rheum evals arranged for additional evaluation  HTN - controlled  T2DM/hyperlipidemia - continue dietary/exercise efforts  No change in rx  OV 6 weeks or prn  I have explained plan to patient and the patient verbalizes understanding

## 2019-09-05 NOTE — PATIENT INSTRUCTIONS
Polymyalgia Rheumatica: Care Instructions  Your Care Instructions  Polymyalgia rheumatica causes pain and swelling in joints and muscles, mainly in the hips, neck, and shoulders. Pain and swelling may be worse in the morning. This condition can occur quickly and often lasts for a year or two. Your doctor will treat you with medicine to reduce swelling. Your symptoms should get much better in 1 to 3 days and go away in 2 to 4 weeks. Still, you may need to take medicine to prevent it from coming back. You may be on the medicine for 1 to 2 years or longer. Some people who have this also get giant cell arteritis (temporal arteritis), which causes swelling of some blood vessels in the head. Tell your doctor if you have any headaches, jaw pain, or tightness or tenderness along the temple or scalp. This condition can cause blindness if it is not treated. Tell your doctor if you have problems with your vision, including blurring or seeing double. Follow-up care is a key part of your treatment and safety. Be sure to make and go to all appointments, and call your doctor if you are having problems. It's also a good idea to know your test results and keep a list of the medicines you take. How can you care for yourself at home? · Take your medicines exactly as prescribed. Call your doctor if you think you are having a problem with your medicine. You may get medicines to reduce pain and to keep your bones from getting thin. · Take anti-inflammatory medicines to reduce pain, if your doctor recommends them. These include ibuprofen (Advil, Motrin) and naproxen (Aleve). Read and follow all instructions on the label. · Eat a healthy diet. Make sure to drink milk and eat dairy products, such as low-fat cheese and yogurt. Ask your doctor how much calcium you need. If you cannot eat dairy products or you do not get enough calcium from food, you may take pills. · Do gentle weight lifting to protect your bones.  This is very important for women who have gone through menopause. · Do not smoke or allow others to smoke around you. If you need help quitting, talk to your doctor about stop-smoking programs and medicines. These can increase your chances of quitting for good. When should you call for help? Call your doctor now or seek immediate medical care if:    · You have a headache, jaw pain, or problems seeing.    Watch closely for changes in your health, and be sure to contact your doctor if:    · Your joint and muscle pain or stiffness gets worse.     · You have side effects from your corticosteroid medicine, such as:  ? Signs of diabetes (feeling thirsty all the time, needing to urinate often). ? Signs of infection (fever, chills, cough, burning during urination, severe sore throat, or skin infection). ? A large weight gain. ? Mood changes. ? Trouble sleeping. ? Bruising easily.     · You have any other problems with your medicine.     · You do not get better as expected. Where can you learn more? Go to http://george-nisha.info/. Enter M396 in the search box to learn more about \"Polymyalgia Rheumatica: Care Instructions. \"  Current as of: April 1, 2019  Content Version: 12.1  © 7375-8698 Healthwise, Incorporated. Care instructions adapted under license by [email or web address] (which disclaims liability or warranty for this information). If you have questions about a medical condition or this instruction, always ask your healthcare professional. [place] [address] any warranty or liability for your use of this information.

## 2019-09-05 NOTE — PROGRESS NOTES
HPI:   F/U of PMR  Shoulder and hip pain minimal on prednisone  Hx +metabolic syndrome  w/o chest pain/abd. discomfort; no dyspnea, cough or increased pedal edema; denies constitutional complaints of fever, night sweats or wt loss; no evidence of GI/ hemorrhage    ROS is otherwise negative. Past Medical History:   Diagnosis Date    Diabetes ([facility] [address]     Hypercholesterolemia     Hypertension     Psoriasis     PVC's (premature ventricular contractions)        Past Surgical History:   Procedure Laterality Date    HX COLONOSCOPY  2007    neg       Social History     Socioeconomic History    Marital status:      Spouse name: Not on file    Number of children: Not on file    Years of education: Not on file    Highest education level: Not on file   Occupational History    Occupation: car sales   Social Needs    Financial resource strain: Not on file    Food insecurity:     Worry: Not on file     Inability: Not on file    Transportation needs:     Medical: Not on file     Non-medical: Not on file   Tobacco Use    Smoking status: Never Smoker    Smokeless tobacco: Never Used   Substance and Sexual Activity    Alcohol use:  Yes     Alcohol/week: 0.0 standard drinks    Drug use: No    Sexual activity: Not on file   Lifestyle    Physical activity:     Days per week: Not on file     Minutes per session: Not on file    Stress: Not on file   Relationships    Social connections:     Talks on phone: Not on file     Gets together: Not on file     Attends Methodist service: Not on file     Active member of club or organization: Not on file     Attends meetings of clubs or organizations: Not on file     Relationship status: Not on file    Intimate partner violence:     Fear of current or ex partner: Not on file     Emotionally abused: Not on file     Physically abused: Not on file     Forced sexual activity: Not on file   Other Topics Concern    Not on file   Social History Narrative    Not on file No Known Allergies    Family History   Problem Relation Age of Onset    Heart Disease Father        Current Outpatient Medications   Medication Sig Dispense Refill    predniSONE (DELTASONE) 20 mg tablet Take 20 mg by mouth daily (with breakfast). 60 Tab 1    omeprazole (PRILOSEC) 20 mg capsule Take 1 Cap by mouth daily. 90 Cap 3    fluocinoNIDE (LIDEX) 0.05 % topical cream Apply bid prn leg rash 60 g 3    SITagliptin-metFORMIN 50-1,000 mg TM24 1 bid 180 Tab 1    amLODIPine-benazepril (LOTREL) 10-40 mg per capsule TAKE 1 CAPSULE BY MOUTH DAILY. 90 Cap 3    atorvastatin (LIPITOR) 40 mg tablet Take 1 Tab by mouth daily. 90 Tab 3           Visit Vitals  /77 (BP 1 Location: Left arm, BP Patient Position: Sitting)   Pulse 84   Temp 98.2 °F (36.8 °C) (Oral)   Resp 19   Ht 6' (1.829 m)   Wt 261 lb 6.4 oz (118.6 kg)   SpO2 94%   BMI 35.45 kg/m²       PE  heavy in NAD  HEENT:   OP: clear. Neck: supple w/o mass or bruits. Chest: clear. CV: RRR w/o m,r,g; pulses intact. Abd: soft, NT, w/o HSM or mass. Ext: tr edema. Neuro: NF. Assessment and Plan    Encounter Diagnoses   Name Primary?     PMR (polymyalgia rheumatica) (HCC) Yes    Type 2 diabetes mellitus without complication, without long-term current use of insulin (HCC)     Essential hypertension        PMR - reduce prednisone to 15 mg every day; keep OV 11/2019 with rheum  HTN - controlled  T2DM/hyperlipidemia - continue dietary/exercise efforts  No change in rx  OV 2 mos or prn  I have explained plan to patient and the patient verbalizes understanding

## 2019-09-06 LAB
ANTI-DNA (DS) AB QN, 1189: <1 IU/ML
CENTROMERE B ANTIBODY, 601143: <0.2 AI
CHROMATIN ANTIBODY: <0.2 AI
ENA SS-A AB SER-ACNC: <0.2 AI
ENA SS-B AB SER-ACNC: <0.2 AI
JO1 ANTIBODY, 8107: <0.2 AI
RHEUMATOID FACTOR QUANT, IMMUNOTURBIDIMETRIC: <20 IU/ML (ref 0–20)
RNP ABS, 016354: <0.2 AI
SCLERODERMA AB (SCL-70), 601116: <0.2 AI
SED RATE (ESR): 16 MM/HR (ref 0–20)
SED RATE (ESR): 16 MM/HR (ref 0–20)
SMITH ABS, 016362: <0.2 AI

## 2019-10-16 ENCOUNTER — OFFICE VISIT (OUTPATIENT)
Dept: FAMILY MEDICINE CLINIC | Age: 64
End: 2019-10-16

## 2019-10-16 VITALS
TEMPERATURE: 98.2 F | WEIGHT: 261.4 LBS | HEIGHT: 72 IN | RESPIRATION RATE: 19 BRPM | DIASTOLIC BLOOD PRESSURE: 77 MMHG | SYSTOLIC BLOOD PRESSURE: 139 MMHG | HEART RATE: 84 BPM | BODY MASS INDEX: 35.41 KG/M2 | OXYGEN SATURATION: 94 %

## 2019-10-16 DIAGNOSIS — I10 ESSENTIAL HYPERTENSION: ICD-10-CM

## 2019-10-16 DIAGNOSIS — M35.3 PMR (POLYMYALGIA RHEUMATICA) (HCC): Primary | ICD-10-CM

## 2019-10-16 DIAGNOSIS — E11.9 TYPE 2 DIABETES MELLITUS WITHOUT COMPLICATION, WITHOUT LONG-TERM CURRENT USE OF INSULIN (HCC): ICD-10-CM

## 2019-10-16 NOTE — PATIENT INSTRUCTIONS
Polymyalgia Rheumatica: Care Instructions  Your Care Instructions  Polymyalgia rheumatica causes pain and swelling in joints and muscles, mainly in the hips, neck, and shoulders. Pain and swelling may be worse in the morning. This condition can occur quickly and often lasts for a year or two. Your doctor will treat you with medicine to reduce swelling. Your symptoms should get much better in 1 to 3 days and go away in 2 to 4 weeks. Still, you may need to take medicine to prevent it from coming back. You may be on the medicine for 1 to 2 years or longer. Some people who have this also get giant cell arteritis (temporal arteritis), which causes swelling of some blood vessels in the head. Tell your doctor if you have any headaches, jaw pain, or tightness or tenderness along the temple or scalp. This condition can cause blindness if it is not treated. Tell your doctor if you have problems with your vision, including blurring or seeing double. Follow-up care is a key part of your treatment and safety. Be sure to make and go to all appointments, and call your doctor if you are having problems. It's also a good idea to know your test results and keep a list of the medicines you take. How can you care for yourself at home? · Take your medicines exactly as prescribed. Call your doctor if you think you are having a problem with your medicine. You may get medicines to reduce pain and to keep your bones from getting thin. · Take anti-inflammatory medicines to reduce pain, if your doctor recommends them. These include ibuprofen (Advil, Motrin) and naproxen (Aleve). Read and follow all instructions on the label. · Eat a healthy diet. Make sure to drink milk and eat dairy products, such as low-fat cheese and yogurt. Ask your doctor how much calcium you need. If you cannot eat dairy products or you do not get enough calcium from food, you may take pills. · Do gentle weight lifting to protect your bones.  This is very important for women who have gone through menopause. · Do not smoke or allow others to smoke around you. If you need help quitting, talk to your doctor about stop-smoking programs and medicines. These can increase your chances of quitting for good. When should you call for help? Call your doctor now or seek immediate medical care if:    · You have a headache, jaw pain, or problems seeing.    Watch closely for changes in your health, and be sure to contact your doctor if:    · Your joint and muscle pain or stiffness gets worse.     · You have side effects from your corticosteroid medicine, such as:  ? Signs of diabetes (feeling thirsty all the time, needing to urinate often). ? Signs of infection (fever, chills, cough, burning during urination, severe sore throat, or skin infection). ? A large weight gain. ? Mood changes. ? Trouble sleeping. ? Bruising easily.     · You have any other problems with your medicine.     · You do not get better as expected. Where can you learn more? Go to http://george-nisha.info/. Enter M396 in the search box to learn more about \"Polymyalgia Rheumatica: Care Instructions. \"  Current as of: April 1, 2019  Content Version: 12.2  © 0386-8596 [employer]. Care instructions adapted under license by [employer] (which disclaims liability or warranty for this information). If you have questions about a medical condition or this instruction, always ask your healthcare professional. [place] [address] any warranty or liability for your use of this information.

## 2019-10-17 LAB
A-G RATIO,AGRAT: 1.7 RATIO (ref 1.1–2.6)
ALBUMIN SERPL-MCNC: 3.8 G/DL (ref 3.5–5)
ALP SERPL-CCNC: 90 U/L (ref 40–125)
ALT SERPL-CCNC: 28 U/L (ref 5–40)
ANION GAP SERPL CALC-SCNC: 16 MMOL/L
AST SERPL W P-5'-P-CCNC: 22 U/L (ref 10–37)
AVG GLU, 10930: 274 MG/DL (ref 91–123)
BILIRUB SERPL-MCNC: 0.5 MG/DL (ref 0.2–1.2)
BUN SERPL-MCNC: 16 MG/DL (ref 6–22)
CALCIUM SERPL-MCNC: 8.8 MG/DL (ref 8.4–10.4)
CHLORIDE SERPL-SCNC: 102 MMOL/L (ref 98–110)
CO2 SERPL-SCNC: 23 MMOL/L (ref 20–32)
CREAT SERPL-MCNC: 0.6 MG/DL (ref 0.8–1.6)
GFRAA, 66117: >60
GFRNA, 66118: >60
GLOBULIN,GLOB: 2.2 G/DL (ref 2–4)
GLUCOSE SERPL-MCNC: 251 MG/DL (ref 70–99)
HBA1C MFR BLD HPLC: 11.2 % (ref 4.8–5.9)
POTASSIUM SERPL-SCNC: 4.1 MMOL/L (ref 3.5–5.5)
PROT SERPL-MCNC: 6 G/DL (ref 6.2–8.1)
SED RATE (ESR): 11 MM/HR (ref 0–20)
SODIUM SERPL-SCNC: 141 MMOL/L (ref 133–145)

## 2019-10-17 RX ORDER — GLIMEPIRIDE 4 MG/1
4 TABLET ORAL
Qty: 90 TAB | Refills: 1 | Status: SHIPPED | OUTPATIENT
Start: 2019-10-17 | End: 2020-02-26 | Stop reason: SDUPTHER

## 2019-10-17 RX ORDER — PREDNISONE 20 MG/1
TABLET ORAL
Qty: 90 TAB | Refills: 1
Start: 2019-10-17 | End: 2019-12-12

## 2019-10-18 DIAGNOSIS — M35.3 PMR (POLYMYALGIA RHEUMATICA) (HCC): Primary | ICD-10-CM

## 2019-10-18 DIAGNOSIS — M35.3 PMR (POLYMYALGIA RHEUMATICA) (HCC): ICD-10-CM

## 2019-12-09 NOTE — PROGRESS NOTES
HPI:  F/u visit for routine evaluation of HTN, T2DM, hyperlipidemia  A1C/chol 8.1/134; A1C 11.2 Oct 2019 (glimepiride added)  Has required prednisone since August 2019 for presumed PMR  Reduced dosage of prednisone 10/16/19 to 15 mg qd w/o increased shoulder/hip girdle pain; has seen rheumatology   Currently w/o chest pain/abd. discomfort; no dyspnea, cough or increased  pedal edema; denies constitutional complaints of fever, night sweats or wt loss; no evidence of GI/ hemorrhage    ROS is otherwise negative. Past Medical History:   Diagnosis Date    Diabetes ([facility] [address]     Hypercholesterolemia     Hypertension     Psoriasis     PVC's (premature ventricular contractions)        Past Surgical History:   Procedure Laterality Date    HX COLONOSCOPY  2007    neg       Social History     Socioeconomic History    Marital status:      Spouse name: Not on file    Number of children: Not on file    Years of education: Not on file    Highest education level: Not on file   Occupational History    Occupation: car sales   Social Needs    Financial resource strain: Not on file    Food insecurity:     Worry: Not on file     Inability: Not on file    Transportation needs:     Medical: Not on file     Non-medical: Not on file   Tobacco Use    Smoking status: Never Smoker    Smokeless tobacco: Never Used   Substance and Sexual Activity    Alcohol use:  Yes     Alcohol/week: 0.0 standard drinks    Drug use: No    Sexual activity: Not on file   Lifestyle    Physical activity:     Days per week: Not on file     Minutes per session: Not on file    Stress: Not on file   Relationships    Social connections:     Talks on phone: Not on file     Gets together: Not on file     Attends Judaism service: Not on file     Active member of club or organization: Not on file     Attends meetings of clubs or organizations: Not on file     Relationship status: Not on file    Intimate partner violence:     Fear of current or ex partner: Not on file     Emotionally abused: Not on file     Physically abused: Not on file     Forced sexual activity: Not on file   Other Topics Concern    Not on file   Social History Narrative    Not on file       No Known Allergies    Family History   Problem Relation Age of Onset    Heart Disease Father        Current Outpatient Medications   Medication Sig Dispense Refill    predniSONE (DELTASONE) 5 mg tablet Take  by mouth.  SITagliptin-metFORMIN (JANUMET XR) 50-1,000 mg TM24 TAKE 1 TABLET BY MOUTH TWICE A  Tab 1    glimepiride (AMARYL) 4 mg tablet Take 1 Tab by mouth every morning. 90 Tab 1    omeprazole (PRILOSEC) 20 mg capsule Take 1 Cap by mouth daily. 90 Cap 3    fluocinoNIDE (LIDEX) 0.05 % topical cream Apply bid prn leg rash 60 g 3    amLODIPine-benazepril (LOTREL) 10-40 mg per capsule TAKE 1 CAPSULE BY MOUTH DAILY. 90 Cap 3    atorvastatin (LIPITOR) 40 mg tablet Take 1 Tab by mouth daily. 90 Tab 3           Visit Vitals  /80 (BP 1 Location: Right arm, BP Patient Position: Sitting)   Pulse 93   Temp 98.3 °F (36.8 °C) (Tympanic)   Resp 16   Ht 6' (1.829 m)   Wt 273 lb (123.8 kg)   SpO2 94%   BMI 37.03 kg/m²       PE  obese in NAD  HEENT:  OP: clear. Neck: supple w/o mass or bruits. Chest: clear. CV: RRR w/o m,r,g; pulses intact. Abd: soft, NT, w/o HSM or mass. Ext: tr edema. Neuro: NF. Assessment and Plan    Encounter Diagnoses   Name Primary?  PMR (polymyalgia rheumatica) (HCC) Yes    Essential hypertension     Mixed hyperlipidemia      PMR - taper now being managed by rheumatology (currently on 12.5 mg every day)  HTN - controlled  T2DM/hyperlipidemia - continue dietary/exercise efforts; if BS significantly elevated will resrt lantus (has used in past)  Otherwise, no change in rx  OV 3 mos or prn  I have explained plan to patient and the patient verbalizes understanding      Discussed the patient's BMI with him.   The BMI follow up plan is as follows: dietary management education, guidance, and counseling  encourage exercise  monitor weight  prescribed dietary intake    An After Visit Summary was printed and given to the patient.

## 2019-12-12 ENCOUNTER — OFFICE VISIT (OUTPATIENT)
Dept: FAMILY MEDICINE CLINIC | Age: 64
End: 2019-12-12

## 2019-12-12 VITALS
SYSTOLIC BLOOD PRESSURE: 138 MMHG | HEIGHT: 72 IN | TEMPERATURE: 98.3 F | HEART RATE: 93 BPM | DIASTOLIC BLOOD PRESSURE: 80 MMHG | RESPIRATION RATE: 16 BRPM | OXYGEN SATURATION: 94 % | BODY MASS INDEX: 36.98 KG/M2 | WEIGHT: 273 LBS

## 2019-12-12 DIAGNOSIS — M35.3 PMR (POLYMYALGIA RHEUMATICA) (HCC): Primary | ICD-10-CM

## 2019-12-12 DIAGNOSIS — I10 ESSENTIAL HYPERTENSION: ICD-10-CM

## 2019-12-12 DIAGNOSIS — E78.2 MIXED HYPERLIPIDEMIA: ICD-10-CM

## 2019-12-12 RX ORDER — PREDNISONE 5 MG/1
10 TABLET ORAL DAILY
COMMUNITY
End: 2020-12-03 | Stop reason: ALTCHOICE

## 2019-12-12 NOTE — PROGRESS NOTES
Chief Complaint   Patient presents with    Follow-up       Pt preferred language for health care discussion is english. Is someone accompanying this pt? no    Is the patient using any DME equipment during OV? no    Depression Screening:  3 most recent PHQ Screens 12/12/2018 7/28/2017 12/3/2015   Little interest or pleasure in doing things Not at all Not at all Not at all   Feeling down, depressed, irritable, or hopeless Not at all Not at all Not at all   Total Score PHQ 2 0 0 0       Learning Assessment:  Learning Assessment 5/26/2016   PRIMARY LEARNER Patient   HIGHEST LEVEL OF EDUCATION - PRIMARY LEARNER  4 YEARS OF COLLEGE   BARRIERS PRIMARY LEARNER NONE   CO-LEARNER CAREGIVER No   PRIMARY LANGUAGE ENGLISH   LEARNER PREFERENCE PRIMARY READING   ANSWERED BY patient   RELATIONSHIP SELF         Health Maintenance reviewed and discussed per provider. Yes        Advance Directive:  1. Do you have an advance directive in place? Patient Reply:no    2. If not, would you like material regarding how to put one in place? Patient Reply: no    Coordination of Care:  1. Have you been to the ER, urgent care clinic since your last visit? Hospitalized since your last visit? no    2. Have you seen or consulted any other health care providers outside of the [address] [place] [address] since your last visit? Include any pap smears or colon screening.  no    Provider prefers to do his own med reconciliation

## 2019-12-12 NOTE — PATIENT INSTRUCTIONS
Body Mass Index: Care Instructions  Your Care Instructions    Body mass index (BMI) can help you see if your weight is raising your risk for health problems. It uses a formula to compare how much you weigh with how tall you are. · A BMI lower than 18.5 is considered underweight. · A BMI between 18.5 and 24.9 is considered healthy. · A BMI between 25 and 29.9 is considered overweight. A BMI of 30 or higher is considered obese. If your BMI is in the normal range, it means that you have a lower risk for weight-related health problems. If your BMI is in the overweight or obese range, you may be at increased risk for weight-related health problems, such as high blood pressure, heart disease, stroke, arthritis or joint pain, and diabetes. If your BMI is in the underweight range, you may be at increased risk for health problems such as fatigue, lower protection (immunity) against illness, muscle loss, bone loss, hair loss, and hormone problems. BMI is just one measure of your risk for weight-related health problems. You may be at higher risk for health problems if you are not active, you eat an unhealthy diet, or you drink too much alcohol or use tobacco products. Follow-up care is a key part of your treatment and safety. Be sure to make and go to all appointments, and call your doctor if you are having problems. It's also a good idea to know your test results and keep a list of the medicines you take. How can you care for yourself at home? · Practice healthy eating habits. This includes eating plenty of fruits, vegetables, whole grains, lean protein, and low-fat dairy. · If your doctor recommends it, get more exercise. Walking is a good choice. Bit by bit, increase the amount you walk every day. Try for at least 30 minutes on most days of the week. · Do not smoke. Smoking can increase your risk for health problems. If you need help quitting, talk to your doctor about stop-smoking programs and medicines. These can increase your chances of quitting for good. · Limit alcohol to 2 drinks a day for men and 1 drink a day for women. Too much alcohol can cause health problems. If you have a BMI higher than 25  · Your doctor may do other tests to check your risk for weight-related health problems. This may include measuring the distance around your waist. A waist measurement of more than 40 inches in men or 35 inches in women can increase the risk of weight-related health problems. · Talk with your doctor about steps you can take to stay healthy or improve your health. You may need to make lifestyle changes to lose weight and stay healthy, such as changing your diet and getting regular exercise. If you have a BMI lower than 18.5  · Your doctor may do other tests to check your risk for health problems. · Talk with your doctor about steps you can take to stay healthy or improve your health. You may need to make lifestyle changes to gain or maintain weight and stay healthy, such as getting more healthy foods in your diet and doing exercises to build muscle. Where can you learn more? Go to http://george-nisha.info/. Enter S176 in the search box to learn more about \"Body Mass Index: Care Instructions. \"  Current as of: October 13, 2016  Content Version: 11.4  © 0037-6001 Healthwise, Incorporated. Care instructions adapted under license by [employer] (which disclaims liability or warranty for this information). If you have questions about a medical condition or this instruction, always ask your healthcare professional. [address] any warranty or liability for your use of this information.

## 2019-12-13 LAB
ANION GAP SERPL CALC-SCNC: 18 MMOL/L
BUN SERPL-MCNC: 11 MG/DL (ref 6–22)
CALCIUM SERPL-MCNC: 9 MG/DL (ref 8.4–10.5)
CHLORIDE SERPL-SCNC: 104 MMOL/L (ref 98–110)
CO2 SERPL-SCNC: 24 MMOL/L (ref 20–32)
CREAT SERPL-MCNC: 0.7 MG/DL (ref 0.8–1.6)
GFRAA, 66117: >60
GFRNA, 66118: >60
GLUCOSE SERPL-MCNC: 150 MG/DL (ref 70–99)
POTASSIUM SERPL-SCNC: 4.2 MMOL/L (ref 3.5–5.5)
SED RATE (ESR): 11 MM/HR (ref 0–20)
SODIUM SERPL-SCNC: 146 MMOL/L (ref 133–145)

## 2020-01-09 RX ORDER — AMLODIPINE BESYLATE AND BENAZEPRIL HYDROCHLORIDE 10; 40 MG/1; MG/1
CAPSULE ORAL
Qty: 90 CAP | Refills: 3 | Status: SHIPPED | OUTPATIENT
Start: 2020-01-09 | End: 2020-02-26 | Stop reason: SDUPTHER

## 2020-02-03 NOTE — TELEPHONE ENCOUNTER
Pt called to request order for new glucose meter to be sent to pharmacy.  Please adv pt [phone]

## 2020-02-04 RX ORDER — INSULIN PUMP SYRINGE, 3 ML
EACH MISCELLANEOUS
Qty: 1 KIT | Refills: 0 | Status: SHIPPED | OUTPATIENT
Start: 2020-02-04

## 2020-02-13 DIAGNOSIS — E11.9 TYPE 2 DIABETES MELLITUS WITHOUT COMPLICATION, WITHOUT LONG-TERM CURRENT USE OF INSULIN (HCC): Primary | ICD-10-CM

## 2020-02-13 RX ORDER — LANCETS
EACH MISCELLANEOUS
Qty: 100 EACH | Refills: 5 | Status: SHIPPED | OUTPATIENT
Start: 2020-02-13

## 2020-02-13 NOTE — TELEPHONE ENCOUNTER
Pharmacy sent requests for lancets and test strips. New Rx's pended. Please sign if appropriate.     Requested Prescriptions     Pending Prescriptions Disp Refills    glucose blood VI test strips (ACCU-CHEK IWONA PLUS TEST STRP) strip 100 Strip 5     Sig: Patient to check to blood sugar twice daily E11.9    Blood Glucose Control High&Low (ACCU-CHEK IWONA CONTROL SOLN) soln 1 Bottle 5     Sig: Patient to check to blood sugar twice daily E11.9    lancets (ACCU-CHEK SOFTCLIX LANCETS) misc 100 Each 5     Sig: Patient to check to blood sugar twice daily E11.9

## 2020-02-13 NOTE — TELEPHONE ENCOUNTER
Patient is requesting to have the strips and solution  called in for Accucheck Tianna Plus.  Please advise

## 2020-02-19 ENCOUNTER — TELEPHONE (OUTPATIENT)
Dept: FAMILY MEDICINE CLINIC | Age: 65
End: 2020-02-19

## 2020-02-19 DIAGNOSIS — E11.9 TYPE 2 DIABETES MELLITUS WITHOUT COMPLICATION, WITHOUT LONG-TERM CURRENT USE OF INSULIN (HCC): ICD-10-CM

## 2020-02-19 DIAGNOSIS — E11.9 TYPE 2 DIABETES MELLITUS WITHOUT COMPLICATION, WITHOUT LONG-TERM CURRENT USE OF INSULIN (HCC): Primary | ICD-10-CM

## 2020-02-19 NOTE — TELEPHONE ENCOUNTER
Called and spoke with patient. Has appointment for next Wednesday instructed to come in a few days prior to have fast labs drawn. Voices understanding.

## 2020-02-19 NOTE — TELEPHONE ENCOUNTER
Pt called in, states was told by his rheumatoid dr to contact his pcp to have blood glucose levels checked due to being elevated since taking prednisone. Pt wants to know if can just come in to get blood work done or if need to come in for appt.  Please adv pt [phone]

## 2020-02-19 NOTE — PROGRESS NOTES
HPI:   F/u visit for routine evaluation of HTN, T2DM, hyperlipidemia  A1C/chol done recently 7.9/137  Receiving prednisone for PMR (rheumatology)  Currently w/o chest pain/abd. discomfort; no dyspnea, cough or increased pedal edema; denies constitutional complaints of fever, night sweats or wt loss; no evidence of GI/ hemorrhage    ROS is otherwise negative. Past Medical History:   Diagnosis Date    Diabetes ([facility] [address]     Hypercholesterolemia     Hypertension     Psoriasis     PVC's (premature ventricular contractions)        Past Surgical History:   Procedure Laterality Date    HX COLONOSCOPY  2007    neg       Social History     Socioeconomic History    Marital status:      Spouse name: Not on file    Number of children: Not on file    Years of education: Not on file    Highest education level: Not on file   Occupational History    Occupation: car sales   Social Needs    Financial resource strain: Not on file    Food insecurity:     Worry: Not on file     Inability: Not on file    Transportation needs:     Medical: Not on file     Non-medical: Not on file   Tobacco Use    Smoking status: Never Smoker    Smokeless tobacco: Never Used   Substance and Sexual Activity    Alcohol use:  Yes     Alcohol/week: 0.0 standard drinks    Drug use: No    Sexual activity: Not on file   Lifestyle    Physical activity:     Days per week: Not on file     Minutes per session: Not on file    Stress: Not on file   Relationships    Social connections:     Talks on phone: Not on file     Gets together: Not on file     Attends Worship service: Not on file     Active member of club or organization: Not on file     Attends meetings of clubs or organizations: Not on file     Relationship status: Not on file    Intimate partner violence:     Fear of current or ex partner: Not on file     Emotionally abused: Not on file     Physically abused: Not on file     Forced sexual activity: Not on file   Other Topics Concern    Not on file   Social History Narrative    Not on file       No Known Allergies    Family History   Problem Relation Age of Onset    Heart Disease Father        Current Outpatient Medications   Medication Sig Dispense Refill    glucose blood VI test strips (ACCU-CHEK IWONA PLUS TEST STRP) strip Patient to check to blood sugar twice daily E11.9 100 Strip 5    Blood Glucose Control High&Low (ACCU-CHEK IWONA CONTROL SOLN) soln Patient to check to blood sugar twice daily E11.9 1 Bottle 5    lancets (ACCU-CHEK SOFTCLIX LANCETS) misc Patient to check to blood sugar twice daily E11.9 100 Each 5    Blood-Glucose Meter monitoring kit Patient to check to blood sugar twice daily E11.9 1 Kit 0    amLODIPine-benazepril (LOTREL) 10-40 mg per capsule TAKE 1 CAPSULE BY MOUTH DAILY. 90 Cap 3    predniSONE (DELTASONE) 5 mg tablet Take  by mouth.  SITagliptin-metFORMIN (JANUMET XR) 50-1,000 mg TM24 TAKE 1 TABLET BY MOUTH TWICE A  Tab 1    glimepiride (AMARYL) 4 mg tablet Take 1 Tab by mouth every morning. 90 Tab 1    omeprazole (PRILOSEC) 20 mg capsule Take 1 Cap by mouth daily. 90 Cap 3    fluocinoNIDE (LIDEX) 0.05 % topical cream Apply bid prn leg rash 60 g 3    atorvastatin (LIPITOR) 40 mg tablet Take 1 Tab by mouth daily. 90 Tab 3           Visit Vitals  /80   Pulse 85   Temp 98 °F (36.7 °C)   Resp 15   Ht 6' (1.829 m)   Wt 284 lb (128.8 kg)   BMI 38.52 kg/m²       PE  heavy in NAD  HEENT:   OP: clear. Neck: supple w/o mass or bruits. Chest: clear. CV: RRR w/o m,r,g; pulses intact. Abd: soft, NT, w/o HSM or mass. Ext: tr edema. Neuro: NF. Assessment and Plan    Encounter Diagnoses   Name Primary?     Type 2 diabetes mellitus without complication, without long-term current use of insulin (HCC) Yes    Essential hypertension     Mixed hyperlipidemia     PMR (polymyalgia rheumatica) (HCC)        HTN - controlled  T2DM/hyperlipidemia - continue dietary/exercise efforts  PMR - rheumatology is slowly tapering prednisone dose  No change in rx  OV 4 mos or prn  I have explained plan to patient and the patient verbalizes understanding      Discussed the patient's BMI with him. The BMI follow up plan is as follows:     dietary management education, guidance, and counseling  encourage exercise  monitor weight  prescribed dietary intake    An After Visit Summary was printed and given to the patient.

## 2020-02-20 LAB
AVG GLU, 10930: 181 MG/DL (ref 91–123)
HBA1C MFR BLD HPLC: 7.9 % (ref 4.8–5.6)

## 2020-02-21 LAB
A-G RATIO,AGRAT: 2.1 RATIO (ref 1.1–2.6)
ALBUMIN SERPL-MCNC: 4.1 G/DL (ref 3.5–5)
ALP SERPL-CCNC: 91 U/L (ref 40–125)
ALT SERPL-CCNC: 25 U/L (ref 5–40)
ANION GAP SERPL CALC-SCNC: 15 MMOL/L
AST SERPL W P-5'-P-CCNC: 20 U/L (ref 10–37)
BILIRUB SERPL-MCNC: 0.4 MG/DL (ref 0.2–1.2)
BUN SERPL-MCNC: 17 MG/DL (ref 6–22)
CALCIUM SERPL-MCNC: 9.7 MG/DL (ref 8.4–10.5)
CHLORIDE SERPL-SCNC: 104 MMOL/L (ref 98–110)
CHOLEST SERPL-MCNC: 137 MG/DL (ref 110–200)
CO2 SERPL-SCNC: 24 MMOL/L (ref 20–32)
CREAT SERPL-MCNC: 0.7 MG/DL (ref 0.8–1.6)
GFRAA, 66117: >60
GFRNA, 66118: >60
GLOBULIN,GLOB: 2 G/DL (ref 2–4)
GLUCOSE SERPL-MCNC: 142 MG/DL (ref 70–99)
HDLC SERPL-MCNC: 3.6 MG/DL (ref 0–5)
HDLC SERPL-MCNC: 38 MG/DL
LDL/HDL RATIO,LDHD: 1.8
LDLC SERPL CALC-MCNC: 67 MG/DL (ref 50–99)
NON-HDL CHOLESTEROL, 011976: 99 MG/DL
POTASSIUM SERPL-SCNC: 3.9 MMOL/L (ref 3.5–5.5)
PROT SERPL-MCNC: 6.1 G/DL (ref 6.2–8.1)
SODIUM SERPL-SCNC: 143 MMOL/L (ref 133–145)
TRIGL SERPL-MCNC: 162 MG/DL (ref 40–149)
VLDLC SERPL CALC-MCNC: 32 MG/DL (ref 8–30)

## 2020-02-26 ENCOUNTER — OFFICE VISIT (OUTPATIENT)
Dept: FAMILY MEDICINE CLINIC | Age: 65
End: 2020-02-26

## 2020-02-26 VITALS
DIASTOLIC BLOOD PRESSURE: 80 MMHG | SYSTOLIC BLOOD PRESSURE: 136 MMHG | BODY MASS INDEX: 38.47 KG/M2 | WEIGHT: 284 LBS | TEMPERATURE: 98 F | HEIGHT: 72 IN | HEART RATE: 85 BPM | RESPIRATION RATE: 15 BRPM

## 2020-02-26 DIAGNOSIS — E11.9 TYPE 2 DIABETES MELLITUS WITHOUT COMPLICATION, WITHOUT LONG-TERM CURRENT USE OF INSULIN (HCC): ICD-10-CM

## 2020-02-26 DIAGNOSIS — E11.9 TYPE 2 DIABETES MELLITUS WITHOUT COMPLICATION, WITHOUT LONG-TERM CURRENT USE OF INSULIN (HCC): Primary | ICD-10-CM

## 2020-02-26 DIAGNOSIS — M35.3 PMR (POLYMYALGIA RHEUMATICA) (HCC): ICD-10-CM

## 2020-02-26 DIAGNOSIS — I10 ESSENTIAL HYPERTENSION: ICD-10-CM

## 2020-02-26 DIAGNOSIS — E78.2 MIXED HYPERLIPIDEMIA: ICD-10-CM

## 2020-02-26 RX ORDER — ATORVASTATIN CALCIUM 40 MG/1
40 TABLET, FILM COATED ORAL DAILY
Qty: 90 TAB | Refills: 0 | Status: SHIPPED | OUTPATIENT
Start: 2020-02-26 | End: 2020-03-02

## 2020-02-26 RX ORDER — OMEPRAZOLE 20 MG/1
20 CAPSULE, DELAYED RELEASE ORAL DAILY
Qty: 90 CAP | Refills: 3 | OUTPATIENT
Start: 2020-02-26

## 2020-02-26 RX ORDER — PREDNISONE 5 MG/1
10 TABLET ORAL DAILY
Qty: 180 TAB | Refills: 0 | OUTPATIENT
Start: 2020-02-26

## 2020-02-26 RX ORDER — AMLODIPINE BESYLATE AND BENAZEPRIL HYDROCHLORIDE 10; 40 MG/1; MG/1
CAPSULE ORAL
Qty: 90 CAP | Refills: 0 | Status: SHIPPED | OUTPATIENT
Start: 2020-02-26 | End: 2020-07-01

## 2020-02-26 RX ORDER — LANCETS
EACH MISCELLANEOUS
Qty: 100 EACH | Refills: 5 | OUTPATIENT
Start: 2020-02-26

## 2020-02-26 RX ORDER — GLIMEPIRIDE 4 MG/1
4 TABLET ORAL
Qty: 90 TAB | Refills: 0 | Status: SHIPPED | OUTPATIENT
Start: 2020-02-26 | End: 2020-07-06

## 2020-02-26 NOTE — PATIENT INSTRUCTIONS
Counting Carbohydrates: Care Instructions  Your Care Instructions    You don't have to eat special foods when you have diabetes. You just have to be careful to eat healthy foods. Carbohydrates (carbs) raise blood sugar higher and quicker than any other nutrient. Carbs are found in desserts, breads and cereals, and fruit. They're also in starchy vegetables. These include potatoes, corn, and grains such as rice and pasta. Carbs are also in milk and yogurt. The more carbs you eat at one time, the higher your blood sugar will rise. Spreading carbs all through the day helps keep your blood sugar levels within your target range. Counting carbs is one of the best ways to keep your blood sugar under control. If you use insulin, counting carbs helps you match the right amount of insulin to the number of grams of carbs in a meal. Then you can change your diet and insulin dose as needed. Testing your blood sugar several times a day can help you learn how carbs affect your blood sugar. A registered dietitian or certified diabetes educator can help you plan meals and snacks. Follow-up care is a key part of your treatment and safety. Be sure to make and go to all appointments, and call your doctor if you are having problems. It's also a good idea to know your test results and keep a list of the medicines you take. How can you care for yourself at home? Know your daily amount of carbohydrates  Your daily amount depends on several things, such as your weight, how active you are, which diabetes medicines you take, and what your goals are for your blood sugar levels. A registered dietitian or certified diabetes educator can help you plan how many carbs to include in each meal and snack. For most adults, a guideline for the daily amount of carbs is:  · 45 to 60 grams at each meal. That's about the same as 3 to 4 carbohydrate servings. · 15 to 20 grams at each snack. That's about the same as 1 carbohydrate serving.   Count carbs  Counting carbs lets you know how much rapid-acting insulin to take before you eat. If you use an insulin pump, you get a constant rate of insulin during the day. So the pump must be programmed at meals. This gives you extra insulin to cover the rise in blood sugar after meals. If you take insulin:  · Learn your own insulin-to-carb ratio. You and your diabetes health professional will figure out the ratio. You can do this by testing your blood sugar after meals. For example, you may need a certain amount of insulin for every 15 grams of carbs. · Add up the carb grams in a meal. Then you can figure out how many units of insulin to take based on your insulin-to-carb ratio. · Exercise lowers blood sugar. You can use less insulin than you would if you were not doing exercise. Keep in mind that timing matters. If you exercise within 1 hour after a meal, your body may need less insulin for that meal than it would if you exercised 3 hours after the meal. Test your blood sugar to find out how exercise affects your need for insulin. If you do or don't take insulin:  · Look at labels on packaged foods. This can tell you how many carbs are in a serving. You can also use guides from the American Diabetes Association. · Be aware of portions, or serving sizes. If a package has two servings and you eat the whole package, you need to double the number of grams of carbohydrate listed for one serving. · Protein, fat, and fiber do not raise blood sugar as much as carbs do. If you eat a lot of these nutrients in a meal, your blood sugar will rise more slowly than it would otherwise. Eat from all food groups  · Eat at least three meals a day. · Plan meals to include food from all the food groups. The food groups include grains, fruits, dairy, proteins, and vegetables. · Talk to your dietitian or diabetes educator about ways to add limited amounts of sweets into your meal plan. · If you drink alcohol, talk to your doctor. It may not be recommended when you are taking certain diabetes medicines. Where can you learn more? Go to http://george-nisha.info/. Enter D698 in the search box to learn more about \"Counting Carbohydrates: Care Instructions. \"  Current as of: April 16, 2019  Content Version: 12.2  © 8398-8167 [employer]. Care instructions adapted under license by [employer] (which disclaims liability or warranty for this information). If you have questions about a medical condition or this instruction, always ask your healthcare professional. [address] any warranty or liability for your use of this information. Body Mass Index: Care Instructions  Your Care Instructions    Body mass index (BMI) can help you see if your weight is raising your risk for health problems. It uses a formula to compare how much you weigh with how tall you are. · A BMI lower than 18.5 is considered underweight. · A BMI between 18.5 and 24.9 is considered healthy. · A BMI between 25 and 29.9 is considered overweight. A BMI of 30 or higher is considered obese. If your BMI is in the normal range, it means that you have a lower risk for weight-related health problems. If your BMI is in the overweight or obese range, you may be at increased risk for weight-related health problems, such as high blood pressure, heart disease, stroke, arthritis or joint pain, and diabetes. If your BMI is in the underweight range, you may be at increased risk for health problems such as fatigue, lower protection (immunity) against illness, muscle loss, bone loss, hair loss, and hormone problems. BMI is just one measure of your risk for weight-related health problems. You may be at higher risk for health problems if you are not active, you eat an unhealthy diet, or you drink too much alcohol or use tobacco products. Follow-up care is a key part of your treatment and safety.  Be sure to make and go to all appointments, and call your doctor if you are having problems. It's also a good idea to know your test results and keep a list of the medicines you take. How can you care for yourself at home? · Practice healthy eating habits. This includes eating plenty of fruits, vegetables, whole grains, lean protein, and low-fat dairy. · If your doctor recommends it, get more exercise. Walking is a good choice. Bit by bit, increase the amount you walk every day. Try for at least 30 minutes on most days of the week. · Do not smoke. Smoking can increase your risk for health problems. If you need help quitting, talk to your doctor about stop-smoking programs and medicines. These can increase your chances of quitting for good. · Limit alcohol to 2 drinks a day for men and 1 drink a day for women. Too much alcohol can cause health problems. If you have a BMI higher than 25  · Your doctor may do other tests to check your risk for weight-related health problems. This may include measuring the distance around your waist. A waist measurement of more than 40 inches in men or 35 inches in women can increase the risk of weight-related health problems. · Talk with your doctor about steps you can take to stay healthy or improve your health. You may need to make lifestyle changes to lose weight and stay healthy, such as changing your diet and getting regular exercise. If you have a BMI lower than 18.5  · Your doctor may do other tests to check your risk for health problems. · Talk with your doctor about steps you can take to stay healthy or improve your health. You may need to make lifestyle changes to gain or maintain weight and stay healthy, such as getting more healthy foods in your diet and doing exercises to build muscle. Where can you learn more? Go to http://george-nisha.info/. Enter S176 in the search box to learn more about \"Body Mass Index: Care Instructions. \"  Current as of: October 13, 2016  Content Version: 11.4  © 7289-1957 [employer]. Care instructions adapted under license by [employer] (which disclaims liability or warranty for this information). If you have questions about a medical condition or this instruction, always ask your healthcare professional. [address] any warranty or liability for your use of this information.

## 2020-03-02 RX ORDER — ATORVASTATIN CALCIUM 40 MG/1
TABLET, FILM COATED ORAL
Qty: 90 TAB | Refills: 3 | Status: SHIPPED | OUTPATIENT
Start: 2020-03-02 | End: 2021-03-09

## 2020-07-01 RX ORDER — AMLODIPINE BESYLATE AND BENAZEPRIL HYDROCHLORIDE 10; 40 MG/1; MG/1
CAPSULE ORAL
Qty: 90 CAP | Refills: 0 | Status: SHIPPED | OUTPATIENT
Start: 2020-07-01 | End: 2020-07-15 | Stop reason: SDUPTHER

## 2020-07-06 RX ORDER — GLIMEPIRIDE 4 MG/1
TABLET ORAL
Qty: 90 TAB | Refills: 0 | Status: SHIPPED | OUTPATIENT
Start: 2020-07-06 | End: 2020-12-03 | Stop reason: ALTCHOICE

## 2020-07-07 NOTE — PROGRESS NOTES
Abigail Parada is a 62 y.o. male who was seen by synchronous (real-time) audio-video technology on 7/15/2020 for No chief complaint on file. Assessment & Plan:  
{There are no diagnoses linked to this encounter. (Refresh or delete this SmartLink)} HTN/T2DM/hyperlipidemia - continue dietary/exercise efforts No change in rx 
OV 4 mos or prn I have explained plan to patient and the patient verbalizes understanding I spent at least 15 minutes on this visit with this established patient. [address] Subjective: F/u visit for routine evaluation of HTN, T2DM, hyperlipidemia Recent A1C 6.3 Rheum tapering prednisone for PMR Doing well w/o chest/abdominal pain, dyspnea, cough, fever, evidence of GI/ hemorrhage, constitutional complaints Prior to Admission medications Medication Sig Start Date End Date Taking? Authorizing Provider  
glimepiride (AMARYL) 4 mg tablet TAKE 1 TABLET BY MOUTH EVERY DAY IN THE MORNING 7/6/20   Mars Samuels MD  
amLODIPine-benazepril (LOTREL) 10-40 mg per capsule TAKE 1 CAPSULE BY MOUTH EVERY DAY 7/1/20   Mars Samuels MD  
atorvastatin (LIPITOR) 40 mg tablet TAKE 1 TABLET BY MOUTH EVERY DAY 3/2/20   Mars Samuels MD  
SITagliptin-metFORMIN (JANUMET XR) 50-1,000 mg TM24 TAKE 1 TABLET BY MOUTH TWICE A DAY 2/26/20   Mars Samuels MD  
glucose blood VI test strips (ACCU-CHEK IWONA PLUS TEST STRP) strip Patient to check to blood sugar twice daily E11.9 2/13/20   Mars Samuels MD  
Blood Glucose Control High&Low (ACCU-CHEK IWONA CONTROL SOLN) soln Patient to check to blood sugar twice daily E11.9 2/13/20   Mars Samuels MD  
lancets (ACCU-CHEK SOFTCLIX LANCETS) misc Patient to check to blood sugar twice daily E11.9 2/13/20   Mars Samuels MD  
Blood-Glucose Meter monitoring kit Patient to check to blood sugar twice daily E11.9 2/4/20   Mars Samuels MD  
predniSONE (DELTASONE) 5 mg tablet Take 10 mg by mouth daily.     Provider, Historical  
 omeprazole (PRILOSEC) 20 mg capsule Take 1 Cap by mouth daily. 9/5/19   Raudel Wooten MD  
fluocinoNIDE (LIDEX) 0.05 % topical cream Apply bid prn leg rash 6/26/19   Raudel Wooten MD  
 
Current Outpatient Medications Medication Sig Dispense Refill  glimepiride (AMARYL) 4 mg tablet TAKE 1 TABLET BY MOUTH EVERY DAY IN THE MORNING 90 Tab 0  
 amLODIPine-benazepril (LOTREL) 10-40 mg per capsule TAKE 1 CAPSULE BY MOUTH EVERY DAY 90 Cap 0  
 atorvastatin (LIPITOR) 40 mg tablet TAKE 1 TABLET BY MOUTH EVERY DAY 90 Tab 3  
 SITagliptin-metFORMIN (JANUMET XR) 50-1,000 mg TM24 TAKE 1 TABLET BY MOUTH TWICE A  Tab 1  
 glucose blood VI test strips (ACCU-CHEK IWONA PLUS TEST STRP) strip Patient to check to blood sugar twice daily E11.9 100 Strip 5  Blood Glucose Control High&Low (ACCU-CHEK IWONA CONTROL SOLN) soln Patient to check to blood sugar twice daily E11.9 1 Bottle 5  
 lancets (ACCU-CHEK SOFTCLIX LANCETS) misc Patient to check to blood sugar twice daily E11.9 100 Each 5  Blood-Glucose Meter monitoring kit Patient to check to blood sugar twice daily E11.9 1 Kit 0  
 predniSONE (DELTASONE) 5 mg tablet Take 10 mg by mouth daily.  omeprazole (PRILOSEC) 20 mg capsule Take 1 Cap by mouth daily. 90 Cap 3  
 fluocinoNIDE (LIDEX) 0.05 % topical cream Apply bid prn leg rash 60 g 3 No Known Allergies Past Medical History:  
Diagnosis Date  Diabetes ([facility] [address]  Hypercholesterolemia  Hypertension  Psoriasis  PVC's (premature ventricular contractions) Past Surgical History:  
Procedure Laterality Date  HX COLONOSCOPY  2007  
 neg ROS per HPI Objective: No flowsheet data found. General: alert, cooperative, no distress Mental  status: normal mood, behavior, speech, dress, motor activity, and thought processes, able to follow commands HENT: NCAT Neck: no visualized mass Resp: no respiratory distress Neuro: no gross deficits Skin: no discoloration or lesions of concern on visible areas Psychiatric: normal affect, consistent with stated mood, no evidence of hallucinations Additional exam findings:  
no We discussed the expected course, resolution and complications of the diagnosis(es) in detail. Medication risks, benefits, costs, interactions, and alternatives were discussed as indicated. I advised him to contact the office if his condition worsens, changes or fails to improve as anticipated. He expressed understanding with the diagnosis(es) and plan. Dejuan Jonna, who was evaluated through a patient-initiated, synchronous (real-time) audio-video encounter, and/or his healthcare decision maker, is aware that it is a billable service, with coverage as determined by his insurance carrier. He provided verbal consent to proceed: Yes, and patient identification was verified. It was conducted pursuant to the emergency declaration under the [facility], [address] [place] [address] authority and the [employer] and [employer] Act. A caregiver was present when appropriate. Ability to conduct physical exam was limited. I was at home. The patient was at home.  
 
 
Clyde Brown MD

## 2020-07-15 ENCOUNTER — VIRTUAL VISIT (OUTPATIENT)
Dept: FAMILY MEDICINE CLINIC | Age: 65
End: 2020-07-15

## 2020-07-15 DIAGNOSIS — E11.9 TYPE 2 DIABETES MELLITUS WITHOUT COMPLICATION, WITHOUT LONG-TERM CURRENT USE OF INSULIN (HCC): Primary | ICD-10-CM

## 2020-07-15 DIAGNOSIS — I10 ESSENTIAL HYPERTENSION: ICD-10-CM

## 2020-07-15 DIAGNOSIS — E78.2 MIXED HYPERLIPIDEMIA: ICD-10-CM

## 2020-07-15 RX ORDER — AMLODIPINE BESYLATE AND BENAZEPRIL HYDROCHLORIDE 10; 40 MG/1; MG/1
CAPSULE ORAL
Qty: 90 CAP | Refills: 3 | Status: SHIPPED | OUTPATIENT
Start: 2020-07-15 | End: 2021-04-08 | Stop reason: SDUPTHER

## 2020-07-15 NOTE — PROGRESS NOTES
Sherron Wilkerson is a 62 y.o. male, evaluated via audio-only technology on 7/15/2020 for Diabetes  . Assessment & Plan:   Diagnoses and all orders for this visit:    1. Type 2 diabetes mellitus without complication, without long-term current use of insulin ([address]    2. Essential hypertension    3. Mixed hyperlipidemia    HTN/T2DM/hyperlipidemia - continue dietary/exercise efforts  No change in rx  OV 4 mos or prn  I have explained plan to patient and the patient verbalizes understanding      12  Subjective:   F/u visit for routine evaluation of HTN, T2DM, hyperlipidemia  A1C 6.3 June 2020 (+volitional wt loss)  Prednisone being tapered for rx of PMR  Doing well w/o chest/abdominal pain, dyspnea, cough, fever, evidence of GI/ hemorrhage, constitutional complaints; physically active      Prior to Admission medications    Medication Sig Start Date End Date Taking?  Authorizing Provider   glimepiride (AMARYL) 4 mg tablet TAKE 1 TABLET BY MOUTH EVERY DAY IN THE MORNING 7/6/20   Vivi Wells MD   amLODIPine-benazepril (LOTREL) 10-40 mg per capsule TAKE 1 CAPSULE BY MOUTH EVERY DAY 7/1/20   Vivi Wells MD   atorvastatin (LIPITOR) 40 mg tablet TAKE 1 TABLET BY MOUTH EVERY DAY 3/2/20   Vivi Wells MD   SITagliptin-metFORMIN (JANUMET XR) 50-1,000 mg TM24 TAKE 1 TABLET BY MOUTH TWICE A DAY 2/26/20   Vivi Wells MD   glucose blood VI test strips (ACCU-CHEK IWONA PLUS TEST STRP) strip Patient to check to blood sugar twice daily E11.9 2/13/20   Vivi Wells MD   Blood Glucose Control High&Low (ACCU-CHEK IWONA CONTROL SOLN) soln Patient to check to blood sugar twice daily E11.9 2/13/20   Vivi Wlels MD   lancets (ACCU-CHEK SOFTCLIX LANCETS) misc Patient to check to blood sugar twice daily E11.9 2/13/20   Vivi Wells MD   Blood-Glucose Meter monitoring kit Patient to check to blood sugar twice daily E11.9 2/4/20   Vivi Wells MD   predniSONE (DELTASONE) 5 mg tablet Take 10 mg by mouth daily. Provider, Historical   omeprazole (PRILOSEC) 20 mg capsule Take 1 Cap by mouth daily. 9/5/19   Nils Brady MD   fluocinoNIDE (LIDEX) 0.05 % topical cream Apply bid prn leg rash 6/26/19   Nils Brady MD     Current Outpatient Medications   Medication Sig Dispense Refill    glimepiride (AMARYL) 4 mg tablet TAKE 1 TABLET BY MOUTH EVERY DAY IN THE MORNING 90 Tab 0    amLODIPine-benazepril (LOTREL) 10-40 mg per capsule TAKE 1 CAPSULE BY MOUTH EVERY DAY 90 Cap 0    atorvastatin (LIPITOR) 40 mg tablet TAKE 1 TABLET BY MOUTH EVERY DAY 90 Tab 3    SITagliptin-metFORMIN (JANUMET XR) 50-1,000 mg TM24 TAKE 1 TABLET BY MOUTH TWICE A  Tab 1    glucose blood VI test strips (ACCU-CHEK IWONA PLUS TEST STRP) strip Patient to check to blood sugar twice daily E11.9 100 Strip 5    Blood Glucose Control High&Low (ACCU-CHEK IWONA CONTROL SOLN) soln Patient to check to blood sugar twice daily E11.9 1 Bottle 5    lancets (ACCU-CHEK SOFTCLIX LANCETS) misc Patient to check to blood sugar twice daily E11.9 100 Each 5    Blood-Glucose Meter monitoring kit Patient to check to blood sugar twice daily E11.9 1 Kit 0    predniSONE (DELTASONE) 5 mg tablet Take 10 mg by mouth daily.  omeprazole (PRILOSEC) 20 mg capsule Take 1 Cap by mouth daily. 90 Cap 3    fluocinoNIDE (LIDEX) 0.05 % topical cream Apply bid prn leg rash 60 g 3     No Known Allergies  Past Medical History:   Diagnosis Date    Diabetes ([address]     Hypercholesterolemia     Hypertension     Psoriasis     PVC's (premature ventricular contractions)      Past Surgical History:   Procedure Laterality Date    HX COLONOSCOPY  2007    neg       ROS per HPI    No flowsheet data found. Anu Cervantse, who was evaluated through a patient-initiated, synchronous (real-time) audio only encounter, and/or her healthcare decision maker, is aware that it is a billable service, with coverage as determined by his insurance carrier.  He provided verbal consent to proceed: Yes. He has not had a related appointment within my department in the past 7 days or scheduled within the next 24 hours.       Total Time: minutes: 11-20 minutes    Rayna Lovell MD

## 2020-08-31 RX ORDER — OMEPRAZOLE 20 MG/1
20 CAPSULE, DELAYED RELEASE ORAL DAILY
Qty: 90 CAP | Refills: 3 | Status: SHIPPED | OUTPATIENT
Start: 2020-08-31 | End: 2021-04-08 | Stop reason: ALTCHOICE

## 2020-08-31 NOTE — TELEPHONE ENCOUNTER
Last Visit: 7/15/20 with MD Juan Alvarenga  Next Appointment: 11/12/20 with MD Juan Alvarenga  Previous Refill Encounter(s): 9/5/19 #90 with 3 refills    Requested Prescriptions     Pending Prescriptions Disp Refills    omeprazole (PRILOSEC) 20 mg capsule 90 Cap 3     Sig: Take 1 Cap by mouth daily.

## 2020-10-14 DIAGNOSIS — Z11.59 NEED FOR HEPATITIS C SCREENING TEST: ICD-10-CM

## 2020-10-14 DIAGNOSIS — Z12.5 SCREENING FOR PROSTATE CANCER: ICD-10-CM

## 2020-10-14 DIAGNOSIS — Z00.00 ROUTINE GENERAL MEDICAL EXAMINATION AT A HEALTH CARE FACILITY: Primary | ICD-10-CM

## 2020-11-17 ENCOUNTER — PATIENT MESSAGE (OUTPATIENT)
Dept: FAMILY MEDICINE CLINIC | Age: 65
End: 2020-11-17

## 2020-11-17 ENCOUNTER — TELEPHONE (OUTPATIENT)
Dept: FAMILY MEDICINE CLINIC | Age: 65
End: 2020-11-17

## 2020-11-17 RX ORDER — SITAGLIPTIN AND METFORMIN HYDROCHLORIDE 50; 1000 MG/1; MG/1
TABLET, FILM COATED, EXTENDED RELEASE ORAL
Qty: 180 TAB | Refills: 1 | OUTPATIENT
Start: 2020-11-17

## 2020-11-17 RX ORDER — SITAGLIPTIN AND METFORMIN HYDROCHLORIDE 50; 1000 MG/1; MG/1
TABLET, FILM COATED, EXTENDED RELEASE ORAL
Qty: 60 TAB | Refills: 0 | Status: SHIPPED | OUTPATIENT
Start: 2020-11-17 | End: 2020-12-23

## 2020-11-17 NOTE — TELEPHONE ENCOUNTER
Called patient no answer, left message that patient need labs and follow up appt. prior to any more refill

## 2020-11-27 ENCOUNTER — APPOINTMENT (OUTPATIENT)
Dept: FAMILY MEDICINE CLINIC | Age: 65
End: 2020-11-27

## 2020-11-27 DIAGNOSIS — Z00.00 ROUTINE GENERAL MEDICAL EXAMINATION AT A HEALTH CARE FACILITY: ICD-10-CM

## 2020-11-27 DIAGNOSIS — Z11.59 NEED FOR HEPATITIS C SCREENING TEST: ICD-10-CM

## 2020-11-27 DIAGNOSIS — Z12.5 SCREENING FOR PROSTATE CANCER: ICD-10-CM

## 2020-11-28 LAB
A-G RATIO,AGRAT: 2.1 RATIO (ref 1.1–2.6)
ABSOLUTE LYMPHOCYTE COUNT, 10803: 1.7 K/UL (ref 1–4.8)
ALBUMIN SERPL-MCNC: 4.1 G/DL (ref 3.5–5)
ALP SERPL-CCNC: 88 U/L (ref 40–125)
ALT SERPL-CCNC: 22 U/L (ref 5–40)
ANION GAP SERPL CALC-SCNC: 10.7 MMOL/L (ref 3–15)
AST SERPL W P-5'-P-CCNC: 21 U/L (ref 10–37)
AVG GLU, 10930: 146 MG/DL (ref 91–123)
BASOPHILS # BLD: 0.1 K/UL (ref 0–0.2)
BASOPHILS NFR BLD: 1 % (ref 0–2)
BILIRUB SERPL-MCNC: 0.4 MG/DL (ref 0.2–1.2)
BUN SERPL-MCNC: 20 MG/DL (ref 6–22)
CALCIUM SERPL-MCNC: 9 MG/DL (ref 8.4–10.5)
CHLORIDE SERPL-SCNC: 107 MMOL/L (ref 98–110)
CHOLEST SERPL-MCNC: 138 MG/DL (ref 110–200)
CO2 SERPL-SCNC: 25 MMOL/L (ref 20–32)
CREAT SERPL-MCNC: 0.8 MG/DL (ref 0.8–1.6)
CREATININE, URINE: 293 MG/DL
EOSINOPHIL # BLD: 0.4 K/UL (ref 0–0.5)
EOSINOPHIL NFR BLD: 5 % (ref 0–6)
ERYTHROCYTE [DISTWIDTH] IN BLOOD BY AUTOMATED COUNT: 13.3 % (ref 10–15.5)
GFRAA, 66117: >60
GFRNA, 66118: >60
GLOBULIN,GLOB: 2 G/DL (ref 2–4)
GLUCOSE SERPL-MCNC: 161 MG/DL (ref 70–99)
GRANULOCYTES,GRANS: 67 % (ref 40–75)
HBA1C MFR BLD HPLC: 6.7 % (ref 4.8–5.6)
HCT VFR BLD AUTO: 40.4 % (ref 39.3–51.6)
HCV AB SER IA-ACNC: NORMAL
HDLC SERPL-MCNC: 3.4 MG/DL (ref 0–5)
HDLC SERPL-MCNC: 41 MG/DL
HGB BLD-MCNC: 12.9 G/DL (ref 13.1–17.2)
LDL/HDL RATIO,LDHD: 1.9
LDLC SERPL CALC-MCNC: 79 MG/DL (ref 50–99)
LYMPHOCYTES, LYMLT: 20 % (ref 20–45)
MCH RBC QN AUTO: 29 PG (ref 26–34)
MCHC RBC AUTO-ENTMCNC: 32 G/DL (ref 31–36)
MCV RBC AUTO: 91 FL (ref 80–95)
MICROALB/CREAT RATIO, 140286: 7.8 (ref 0–30)
MICROALBUMIN,URINE RANDOM 140054: 23 MG/L (ref 0.1–17)
MONOCYTES # BLD: 0.7 K/UL (ref 0.1–1)
MONOCYTES NFR BLD: 8 % (ref 3–12)
NEUTROPHILS # BLD AUTO: 5.8 K/UL (ref 1.8–7.7)
NON-HDL CHOLESTEROL, 011976: 97 MG/DL
PLATELET # BLD AUTO: 193 K/UL (ref 140–440)
PMV BLD AUTO: 11.6 FL (ref 9–13)
POTASSIUM SERPL-SCNC: 4.5 MMOL/L (ref 3.5–5.5)
PROT SERPL-MCNC: 6.1 G/DL (ref 6.2–8.1)
PSA SERPL-MCNC: 0.62 NG/ML
RBC # BLD AUTO: 4.45 M/UL (ref 3.8–5.8)
SODIUM SERPL-SCNC: 143 MMOL/L (ref 133–145)
TRIGL SERPL-MCNC: 86 MG/DL (ref 40–149)
TSH SERPL DL<=0.005 MIU/L-ACNC: 1.11 MCU/ML (ref 0.27–4.2)
VLDLC SERPL CALC-MCNC: 17 MG/DL (ref 8–30)
WBC # BLD AUTO: 8.7 K/UL (ref 4–11)

## 2020-11-30 RX ORDER — SITAGLIPTIN AND METFORMIN HYDROCHLORIDE 50; 1000 MG/1; MG/1
TABLET, FILM COATED, EXTENDED RELEASE ORAL
Qty: 60 TAB | Refills: 0 | Status: CANCELLED | OUTPATIENT
Start: 2020-11-30

## 2020-12-03 ENCOUNTER — OFFICE VISIT (OUTPATIENT)
Dept: FAMILY MEDICINE CLINIC | Age: 65
End: 2020-12-03
Payer: MEDICAID

## 2020-12-03 VITALS
TEMPERATURE: 98.6 F | DIASTOLIC BLOOD PRESSURE: 84 MMHG | BODY MASS INDEX: 31.97 KG/M2 | SYSTOLIC BLOOD PRESSURE: 138 MMHG | WEIGHT: 236 LBS | OXYGEN SATURATION: 97 % | HEIGHT: 72 IN | RESPIRATION RATE: 16 BRPM | HEART RATE: 84 BPM

## 2020-12-03 DIAGNOSIS — I10 ESSENTIAL HYPERTENSION: ICD-10-CM

## 2020-12-03 DIAGNOSIS — E78.2 MIXED HYPERLIPIDEMIA: ICD-10-CM

## 2020-12-03 DIAGNOSIS — M35.3 PMR (POLYMYALGIA RHEUMATICA) (HCC): ICD-10-CM

## 2020-12-03 DIAGNOSIS — E11.9 TYPE 2 DIABETES MELLITUS WITHOUT COMPLICATION, WITHOUT LONG-TERM CURRENT USE OF INSULIN (HCC): Primary | ICD-10-CM

## 2020-12-03 PROCEDURE — 99214 OFFICE O/P EST MOD 30 MIN: CPT | Performed by: INTERNAL MEDICINE

## 2020-12-03 RX ORDER — LANOLIN ALCOHOL/MO/W.PET/CERES
1000 CREAM (GRAM) TOPICAL DAILY
COMMUNITY

## 2020-12-03 NOTE — PROGRESS NOTES
HPI:   F/u visit for routine evaluation of HTN, T2DM, hyperlipidemia  Recent A1C/chol 6.7/138  No acute issues  Now off prednisone for PMR  Currently w/o chest pain/abd. discomfort; no dyspnea, cough or increased pedal edema; denies constitutional complaints of fever, night sweats; +volitional wt loss; no evidence of GI/ hemorrhage; no polyuria/polydipsia. Activity is age appropriate. ROS is otherwise negative. Past Medical History:   Diagnosis Date    Diabetes ([facility] [address]     Hypercholesterolemia     Hypertension     PMR (polymyalgia rheumatica) (HCC)     Psoriasis     PVC's (premature ventricular contractions)        Past Surgical History:   Procedure Laterality Date    HX COLONOSCOPY  2007    neg       Social History     Socioeconomic History    Marital status:      Spouse name: Not on file    Number of children: Not on file    Years of education: Not on file    Highest education level: Not on file   Occupational History    Occupation: car sales   Social Needs    Financial resource strain: Not on file    Food insecurity     Worry: Not on file     Inability: Not on file   [employer] needs     Medical: Not on file     Non-medical: Not on file   Tobacco Use    Smoking status: Never Smoker    Smokeless tobacco: Never Used   Substance and Sexual Activity    Alcohol use:  Yes     Alcohol/week: 0.0 standard drinks    Drug use: No    Sexual activity: Not on file   Lifestyle    Physical activity     Days per week: Not on file     Minutes per session: Not on file    Stress: Not on file   Relationships    Social connections     Talks on phone: Not on file     Gets together: Not on file     Attends Faith service: Not on file     Active member of club or organization: Not on file     Attends meetings of clubs or organizations: Not on file     Relationship status: Not on file    Intimate partner violence     Fear of current or ex partner: Not on file     Emotionally abused: Not on file Physically abused: Not on file     Forced sexual activity: Not on file   Other Topics Concern    Not on file   Social History Narrative    Not on file       No Known Allergies    Family History   Problem Relation Age of Onset    Heart Disease Father        Current Outpatient Medications   Medication Sig Dispense Refill    cyanocobalamin 1,000 mcg tablet Take 1,000 mcg by mouth daily.  SITagliptin-metFORMIN (Janumet XR) 50-1,000 mg TM24 TAKE 1 TABLET BY MOUTH TWICE A DAY 60 Tab 0    omeprazole (PRILOSEC) 20 mg capsule Take 1 Cap by mouth daily. 90 Cap 3    amLODIPine-benazepril (LOTREL) 10-40 mg per capsule TAKE 1 CAPSULE BY MOUTH EVERY DAY 90 Cap 3    atorvastatin (LIPITOR) 40 mg tablet TAKE 1 TABLET BY MOUTH EVERY DAY 90 Tab 3    glucose blood VI test strips (ACCU-CHEK IWONA PLUS TEST STRP) strip Patient to check to blood sugar twice daily E11.9 100 Strip 5    Blood Glucose Control High&Low (ACCU-CHEK IWONA CONTROL SOLN) soln Patient to check to blood sugar twice daily E11.9 1 Bottle 5    lancets (ACCU-CHEK SOFTCLIX LANCETS) misc Patient to check to blood sugar twice daily E11.9 100 Each 5    Blood-Glucose Meter monitoring kit Patient to check to blood sugar twice daily E11.9 1 Kit 0    fluocinoNIDE (LIDEX) 0.05 % topical cream Apply bid prn leg rash 60 g 3           Visit Vitals  /84 (BP 1 Location: Left arm, BP Patient Position: Sitting)   Pulse 84   Temp 98.6 °F (37 °C) (Temporal)   Resp 16   Ht 6' (1.829 m)   Wt 236 lb (107 kg)   SpO2 97%   BMI 32.01 kg/m²       PE  Heavy in NAD  HEENT:  OP: clear. Neck: supple w/o mass or bruits. Chest: clear. CV: RRR w/o m,r,g; pulses intact. Abd: soft, NT, w/o HSM or mass. Rectal: heme neg; prostate 3 FBS and smooth  Ext: tr edema. Neuro: NF. Assessment and Plan    Encounter Diagnoses   Name Primary?     Type 2 diabetes mellitus without complication, without long-term current use of insulin (HCC) Yes    Essential hypertension     Mixed hyperlipidemia     PMR (polymyalgia rheumatica) (HCC)        HTN - controlled  T2DM/hyperlipidemia - continue dietary/exercise efforts  PMR - +remission  Declines flu vaccine  Advised f/u colo  No change in rx  OV 4 mos or prn  I have explained plan to patient and the patient verbalizes understanding

## 2020-12-03 NOTE — PROGRESS NOTES
.  Chief Complaint   Patient presents with    Annual Wellness Visit       Pt preferred language for health care discussion is english. Is someone accompanying this pt? NO    Is the patient using any DME equipment during OV? NO    Depression Screening:  3 most recent [facility] Screens 7/15/2020 12/12/2018 7/28/2017 12/3/2015   Little interest or pleasure in doing things Not at all Not at all Not at all Not at all   Feeling down, depressed, irritable, or hopeless Not at all Not at all Not at all Not at all   Total Score PHQ 2 0 0 0 0       Learning Assessment:  Learning Assessment 5/26/2016   PRIMARY LEARNER Patient   HIGHEST LEVEL OF EDUCATION - PRIMARY LEARNER  4 YEARS [address] CAREGIVER No   PRIMARY LANGUAGE ENGLISH   LEARNER PREFERENCE PRIMARY READING   ANSWERED BY patient   RELATIONSHIP SELF         Health Maintenance reviewed and discussed per provider. Yes  . Advance Directive:  1. Do you have an advance directive in place? Patient Reply:NO    2. If not, would you like material regarding how to put one in place? Patient Reply: NO    Coordination of Care:  1. Have you been to the ER, urgent care clinic since your last visit? Hospitalized since your last visit? NO    2. Have you seen or consulted any other health care providers outside of the [address] [place] [address] since your last visit? Include any pap smears or colon screening.  NO    Provider prefers to do his own med reconciliation

## 2021-03-09 DIAGNOSIS — E11.9 TYPE 2 DIABETES MELLITUS WITHOUT COMPLICATION, WITHOUT LONG-TERM CURRENT USE OF INSULIN (HCC): Primary | ICD-10-CM

## 2021-03-09 NOTE — PROGRESS NOTES
HPI:   F/u visit for routine evaluation of HTN, T2DM, hyperlipidemia  Recent A1C/chol 6.8/138  Currently w/o chest pain/abd. discomfort; no dyspnea, cough or increased pedal edema; denies constitutional complaints of fever, night sweats or wt loss; no evidence of GI/ hemorrhage. Activity is age appropriate. ROS is otherwise negative. Past Medical History:   Diagnosis Date    Diabetes ([address]     Hypercholesterolemia     Hypertension     PMR (polymyalgia rheumatica) (HCC)     Psoriasis     PVC's (premature ventricular contractions)        Past Surgical History:   Procedure Laterality Date    HX COLONOSCOPY  2007    neg       Social History     Socioeconomic History    Marital status:      Spouse name: Not on file    Number of children: Not on file    Years of education: Not on file    Highest education level: Not on file   Occupational History    Occupation: car sales   Social Needs    Financial resource strain: Not on file    Food insecurity     Worry: Not on file     Inability: Not on file   [employer] needs     Medical: Not on file     Non-medical: Not on file   Tobacco Use    Smoking status: Never Smoker    Smokeless tobacco: Never Used   Substance and Sexual Activity    Alcohol use:  Yes     Alcohol/week: 0.0 standard drinks    Drug use: No    Sexual activity: Not on file   Lifestyle    Physical activity     Days per week: Not on file     Minutes per session: Not on file    Stress: Not on file   Relationships    Social connections     Talks on phone: Not on file     Gets together: Not on file     Attends Jainism service: Not on file     Active member of club or organization: Not on file     Attends meetings of clubs or organizations: Not on file     Relationship status: Not on file    Intimate partner violence     Fear of current or ex partner: Not on file     Emotionally abused: Not on file     Physically abused: Not on file     Forced sexual activity: Not on file Other Topics Concern    Not on file   Social History Narrative    Not on file       No Known Allergies    Family History   Problem Relation Age of Onset    Heart Disease Father        Current Outpatient Medications   Medication Sig Dispense Refill    atorvastatin (LIPITOR) 40 mg tablet TAKE 1 TABLET BY MOUTH EVERY DAY 90 Tab 1    Janumet XR 50-1,000 mg TM24 TAKE 1 TABLET BY MOUTH TWICE A DAY 60 Tab 5    cyanocobalamin 1,000 mcg tablet Take 1,000 mcg by mouth daily.  amLODIPine-benazepril (LOTREL) 10-40 mg per capsule TAKE 1 CAPSULE BY MOUTH EVERY DAY 90 Cap 3    glucose blood VI test strips (ACCU-CHEK IWONA PLUS TEST STRP) strip Patient to check to blood sugar twice daily E11.9 100 Strip 5    Blood Glucose Control High&Low (ACCU-CHEK IWONA CONTROL SOLN) soln Patient to check to blood sugar twice daily E11.9 1 Bottle 5    lancets (ACCU-CHEK SOFTCLIX LANCETS) misc Patient to check to blood sugar twice daily E11.9 100 Each 5    Blood-Glucose Meter monitoring kit Patient to check to blood sugar twice daily E11.9 1 Kit 0    fluocinoNIDE (LIDEX) 0.05 % topical cream Apply bid prn leg rash 60 g 3           Visit Vitals  /80   Pulse 76   Temp 98 °F (36.7 °C)   Resp 15   Ht 6' (1.829 m)   Wt 234 lb (106.1 kg)   SpO2 97%   BMI 31.74 kg/m²       PE  Heavy in NAD  HEENT:  OP: clear. Neck: supple w/o mass or bruits. Chest: clear. CV: RRR w/o m,r,g; pulses intact. Abd: soft, NT, w/o HSM or mass. Ext: tr edema. Neuro: NF. Assessment and Plan    Encounter Diagnoses   Name Primary?     Type 2 diabetes mellitus without complication, without long-term current use of insulin ([facility] [address] Yes    Essential hypertension     Mixed hyperlipidemia        HTN - controlled  T2DM/hyperlipidemia - continue dietary/exercise efforts  Ty Ty again advised  No change in rx  OV 4 mos or prn  I have explained plan to patient and the patient verbalizes understanding

## 2021-04-01 ENCOUNTER — APPOINTMENT (OUTPATIENT)
Dept: FAMILY MEDICINE CLINIC | Age: 66
End: 2021-04-01

## 2021-04-01 DIAGNOSIS — E11.9 TYPE 2 DIABETES MELLITUS WITHOUT COMPLICATION, WITHOUT LONG-TERM CURRENT USE OF INSULIN (HCC): ICD-10-CM

## 2021-04-02 LAB
A-G RATIO,AGRAT: 2 RATIO (ref 1.1–2.6)
ALBUMIN SERPL-MCNC: 4.5 G/DL (ref 3.5–5)
ALP SERPL-CCNC: 91 U/L (ref 40–125)
ALT SERPL-CCNC: 16 U/L (ref 5–40)
ANION GAP SERPL CALC-SCNC: 13 MMOL/L (ref 3–15)
AST SERPL W P-5'-P-CCNC: 21 U/L (ref 10–37)
AVG GLU, 10930: 148 MG/DL (ref 91–123)
BILIRUB SERPL-MCNC: 0.5 MG/DL (ref 0.2–1.2)
BUN SERPL-MCNC: 16 MG/DL (ref 6–22)
CALCIUM SERPL-MCNC: 9.6 MG/DL (ref 8.4–10.5)
CHLORIDE SERPL-SCNC: 108 MMOL/L (ref 98–110)
CHOLEST SERPL-MCNC: 138 MG/DL (ref 110–200)
CO2 SERPL-SCNC: 23 MMOL/L (ref 20–32)
CREAT SERPL-MCNC: 0.9 MG/DL (ref 0.8–1.6)
GFRAA, 66117: >60
GFRNA, 66118: >60
GLOBULIN,GLOB: 2.2 G/DL (ref 2–4)
GLUCOSE SERPL-MCNC: 129 MG/DL (ref 70–99)
HBA1C MFR BLD HPLC: 6.8 % (ref 4.8–5.6)
HDLC SERPL-MCNC: 3.3 MG/DL (ref 0–5)
HDLC SERPL-MCNC: 42 MG/DL
LDL/HDL RATIO,LDHD: 1.9
LDLC SERPL CALC-MCNC: 80 MG/DL (ref 50–99)
NON-HDL CHOLESTEROL, 011976: 96 MG/DL
POTASSIUM SERPL-SCNC: 4.3 MMOL/L (ref 3.5–5.5)
PROT SERPL-MCNC: 6.7 G/DL (ref 6.2–8.1)
SODIUM SERPL-SCNC: 144 MMOL/L (ref 133–145)
TRIGL SERPL-MCNC: 80 MG/DL (ref 40–149)
VLDLC SERPL CALC-MCNC: 16 MG/DL (ref 8–30)

## 2021-04-08 ENCOUNTER — OFFICE VISIT (OUTPATIENT)
Dept: FAMILY MEDICINE CLINIC | Age: 66
End: 2021-04-08
Payer: MEDICAID

## 2021-04-08 VITALS
TEMPERATURE: 98 F | HEIGHT: 72 IN | OXYGEN SATURATION: 97 % | SYSTOLIC BLOOD PRESSURE: 134 MMHG | HEART RATE: 76 BPM | RESPIRATION RATE: 15 BRPM | DIASTOLIC BLOOD PRESSURE: 80 MMHG | WEIGHT: 234 LBS | BODY MASS INDEX: 31.69 KG/M2

## 2021-04-08 DIAGNOSIS — E11.9 TYPE 2 DIABETES MELLITUS WITHOUT COMPLICATION, WITHOUT LONG-TERM CURRENT USE OF INSULIN (HCC): Primary | ICD-10-CM

## 2021-04-08 DIAGNOSIS — I10 ESSENTIAL HYPERTENSION: ICD-10-CM

## 2021-04-08 DIAGNOSIS — E78.2 MIXED HYPERLIPIDEMIA: ICD-10-CM

## 2021-04-08 PROCEDURE — 99213 OFFICE O/P EST LOW 20 MIN: CPT | Performed by: INTERNAL MEDICINE

## 2021-04-08 RX ORDER — AMLODIPINE BESYLATE AND BENAZEPRIL HYDROCHLORIDE 10; 40 MG/1; MG/1
CAPSULE ORAL
Qty: 90 CAP | Refills: 1 | Status: SHIPPED | OUTPATIENT
Start: 2021-04-08 | End: 2021-05-07 | Stop reason: SDUPTHER

## 2021-05-07 RX ORDER — SITAGLIPTIN AND METFORMIN HYDROCHLORIDE 50; 1000 MG/1; MG/1
TABLET, FILM COATED, EXTENDED RELEASE ORAL
Qty: 180 TAB | Refills: 1 | Status: SHIPPED | OUTPATIENT
Start: 2021-05-07 | End: 2021-11-23 | Stop reason: SDUPTHER

## 2021-05-07 RX ORDER — ATORVASTATIN CALCIUM 40 MG/1
TABLET, FILM COATED ORAL
Qty: 90 TAB | Refills: 1 | Status: SHIPPED | OUTPATIENT
Start: 2021-05-07 | End: 2021-11-23 | Stop reason: SDUPTHER

## 2021-05-07 RX ORDER — SITAGLIPTIN AND METFORMIN HYDROCHLORIDE 50; 1000 MG/1; MG/1
TABLET, FILM COATED, EXTENDED RELEASE ORAL
Qty: 180 TAB | Refills: 1 | Status: SHIPPED | OUTPATIENT
Start: 2021-05-07 | End: 2021-05-07 | Stop reason: SDUPTHER

## 2021-05-07 RX ORDER — AMLODIPINE BESYLATE AND BENAZEPRIL HYDROCHLORIDE 10; 40 MG/1; MG/1
CAPSULE ORAL
Qty: 90 CAP | Refills: 1 | Status: SHIPPED | OUTPATIENT
Start: 2021-05-07 | End: 2021-11-23 | Stop reason: SDUPTHER

## 2021-06-04 ENCOUNTER — OFFICE VISIT (OUTPATIENT)
Dept: ORTHOPEDIC SURGERY | Age: 66
End: 2021-06-04
Payer: MEDICARE

## 2021-06-04 VITALS
RESPIRATION RATE: 15 BRPM | BODY MASS INDEX: 31.15 KG/M2 | WEIGHT: 230 LBS | HEART RATE: 81 BPM | HEIGHT: 72 IN | OXYGEN SATURATION: 98 %

## 2021-06-04 DIAGNOSIS — M75.01 ADHESIVE CAPSULITIS OF RIGHT SHOULDER: Primary | ICD-10-CM

## 2021-06-04 PROCEDURE — G8756 NO BP MEASURE DOC: HCPCS | Performed by: ORTHOPAEDIC SURGERY

## 2021-06-04 PROCEDURE — 99204 OFFICE O/P NEW MOD 45 MIN: CPT | Performed by: ORTHOPAEDIC SURGERY

## 2021-06-04 PROCEDURE — G8427 DOCREV CUR MEDS BY ELIG CLIN: HCPCS | Performed by: ORTHOPAEDIC SURGERY

## 2021-06-04 PROCEDURE — G8417 CALC BMI ABV UP PARAM F/U: HCPCS | Performed by: ORTHOPAEDIC SURGERY

## 2021-06-04 PROCEDURE — G8432 DEP SCR NOT DOC, RNG: HCPCS | Performed by: ORTHOPAEDIC SURGERY

## 2021-06-04 PROCEDURE — 3017F COLORECTAL CA SCREEN DOC REV: CPT | Performed by: ORTHOPAEDIC SURGERY

## 2021-06-04 NOTE — PROGRESS NOTES
Patient: Rinku Manrique                MRN: [id number]       SSN: [id number]  YOB: 1955        AGE: 60 y.o. SEX: male  Body mass index is 31.19 kg/m². PCP: Roselyn Roblero MD  06/04/21    CHIEF COMPLAINT: Right shoulder pain and stiffness    HPI: Rinku Manrique is a 60 y.o. male patient who presents to the office today with right shoulder pain and stiffness. He has been having shoulder problems off and on for the past 3 years. He says initially he injured his right shoulder when he felt a pop when pulling the starter on the mower. He had some pain which resolved. Shortly thereafter he had bilateral shoulder pain and was diagnosed with polymyalgia rheumatica. He was placed on oral steroids which she says completely resolved his symptoms however when he was weaned off of those he started to have the symptoms again. His left shoulder has done okay but his right shoulder has stiffness and pain with range of motion and inability to do activities due to this. He has had x-rays and an MRI. Past Medical History:   Diagnosis Date    Diabetes ([address]     Hypercholesterolemia     Hypertension     [facility])     Psoriasis     PVC's (premature ventricular contractions)        Family History   Problem Relation Age of Onset    Heart Disease Father        Current Outpatient Medications   Medication Sig Dispense Refill    amLODIPine-benazepril (LOTREL) 10-40 mg per capsule TAKE 1 CAPSULE BY MOUTH EVERY DAY 90 Cap 1    atorvastatin (LIPITOR) 40 mg tablet TAKE 1 TABLET BY MOUTH EVERY DAY 90 Tab 1    SITagliptin-metFORMIN (Janumet XR) 50-1,000 mg TM24 1 bid 180 Tab 1    cyanocobalamin 1,000 mcg tablet Take 1,000 mcg by mouth daily.       glucose blood VI test strips (ACCU-CHEK IWONA PLUS TEST STRP) strip Patient to check to blood sugar twice daily E11.9 100 Strip 5    Blood Glucose Control High&Low (ACCU-CHEK IWONA CONTROL SOLN) soln Patient to check to blood sugar twice daily E11.9 1 Bottle 5    lancets (ACCU-CHEK SOFTCLIX LANCETS) misc Patient to check to blood sugar twice daily E11.9 100 Each 5    Blood-Glucose Meter monitoring kit Patient to check to blood sugar twice daily E11.9 1 Kit 0    fluocinoNIDE (LIDEX) 0.05 % topical cream Apply bid prn leg rash 60 g 3       No Known Allergies    Past Surgical History:   Procedure Laterality Date    HX COLONOSCOPY  2007    neg    HX TONSILLECTOMY         Social History     Socioeconomic History    Marital status:      Spouse name: Not on file    Number of children: Not on file    Years of education: Not on file    Highest education level: Not on file   Occupational History    Occupation: car sales   Tobacco Use    Smoking status: Never Smoker    Smokeless tobacco: Never Used   Substance and Sexual Activity    Alcohol use: Yes     Alcohol/week: 0.0 standard drinks    Drug use: No    Sexual activity: Not on file   Other Topics Concern    Not on file   Social History Narrative    Not on file     Social Determinants of Health     Financial Resource Strain:     Difficulty of Paying Living Expenses:    Food Insecurity:     Worried About Running Out of Food in the Last Year:     [address] in the Last Year:    Transportation Needs:     Lack of Transportation (Medical):      Lack of Transportation (Non-Medical):    Physical Activity:     Days of Exercise per Week:     Minutes of Exercise per Session:    Stress:     Feeling of Stress :    Social Connections:     Frequency of Communication with Friends and Family:     Frequency of Social Gatherings with Friends and Family:     Attends [employer] Services:     Active Member of Clubs or Organizations:     Attends Club or Organization Meetings:     Marital Status:    Intimate Partner Violence:     Fear of Current or Ex-Partner:     Emotionally Abused:     Physically Abused:     Sexually Abused:        REVIEW OF SYSTEMS:      CON: negative for recent weight loss/gain, fever, or chills  EYE: negative for double or blurry vision  ENT: negative for hoarseness  RS:   negative for cough, URI, SOB  CV:  negative for chest pain, palpitations  GI:    negative for blood in stool, nausea/vomiting  :  negative for blood in urine  MS: As per HPI  Other systems reviewed and noted below. PHYSICAL EXAMINATION:  Visit Vitals  Pulse 81   Resp 15   Ht 6' (1.829 m)   Wt 230 lb (104.3 kg)   SpO2 98%   BMI 31.19 kg/m²     Body mass index is 31.19 kg/m². GENERAL: Alert and oriented x3, in no acute distress, well-developed, well-nourished. HEENT: Normocephalic, atraumatic. RESP: Non labored breathing with equal chest rise on inspiration. CV: Well perfused extremities. No cyanosis or clubbing noted. ABDOMEN: Soft, non-tender, non-distended. Shoulder Examination     R   L  ROM   FF  90   Full  ER  30   Full   IR  Hip   Full  Rotator Cuff Pain   Supra  -   -   Infra  -   -   Subscap -   -  Crepitus  -   -  Effusion  -   -  Warmth  -   -   Erythema  -   -  Instability  -   -  AC Joint TTP  -   -  Clavicle   Deformity -   -   TTP  -   -  Proximal Humerus   Deformity -   -   TTP  -   -  Deltoid Strength 5   5  Biceps Strength 5   5  Biceps Deformity -   -  Biceps Groove Pain -   -  Impingement Sign -   -       IMAGING:  Previously taken x-rays of the right shoulder were reviewed in the office today which do not show any acute or chronic bony abdomen is    An MRI of the right shoulder was also reviewed which does not show any full-thickness rotator cuff, biceps tears or arthritic change. ASSESSMENT & PLAN  Diagnosis: Right shoulder adhesive capsulitis    Radha Betancourt has recalcitrant right shoulder adhesive capsulitis. We discussed the treatment options for this at length. I would not recommend any surgery. I recommended conservative treatment. We discussed the possibility of corticosteroid injection which she deferred.   My recommendation is for an aggressive home exercise program which I gave him today to stretch his shoulder. Follow-up in 6 weeks.       Electronically signed by: Marine Lacy MD

## 2021-11-02 ENCOUNTER — APPOINTMENT (OUTPATIENT)
Dept: FAMILY MEDICINE CLINIC | Age: 66
End: 2021-11-02

## 2021-11-03 DIAGNOSIS — E11.9 TYPE 2 DIABETES MELLITUS WITHOUT COMPLICATION, WITHOUT LONG-TERM CURRENT USE OF INSULIN (HCC): Primary | ICD-10-CM

## 2021-11-03 DIAGNOSIS — I10 ESSENTIAL HYPERTENSION: ICD-10-CM

## 2021-11-03 DIAGNOSIS — E78.2 MIXED HYPERLIPIDEMIA: ICD-10-CM

## 2021-11-19 ENCOUNTER — APPOINTMENT (OUTPATIENT)
Dept: FAMILY MEDICINE CLINIC | Age: 66
End: 2021-11-19

## 2021-11-19 DIAGNOSIS — E11.9 TYPE 2 DIABETES MELLITUS WITHOUT COMPLICATION, WITHOUT LONG-TERM CURRENT USE OF INSULIN (HCC): ICD-10-CM

## 2021-11-19 DIAGNOSIS — E78.2 MIXED HYPERLIPIDEMIA: ICD-10-CM

## 2021-11-19 DIAGNOSIS — I10 ESSENTIAL HYPERTENSION: ICD-10-CM

## 2021-11-20 LAB
A-G RATIO,AGRAT: 2.7 RATIO (ref 1.1–2.6)
ALBUMIN SERPL-MCNC: 4.6 G/DL (ref 3.5–5)
ALP SERPL-CCNC: 87 U/L (ref 40–125)
ALT SERPL-CCNC: 23 U/L (ref 5–40)
ANION GAP SERPL CALC-SCNC: 13 MMOL/L (ref 3–15)
AST SERPL W P-5'-P-CCNC: 26 U/L (ref 10–37)
AVG GLU, 10930: 154 MG/DL (ref 91–123)
BILIRUB SERPL-MCNC: 0.6 MG/DL (ref 0.2–1.2)
BUN SERPL-MCNC: 21 MG/DL (ref 6–22)
CALCIUM SERPL-MCNC: 9.6 MG/DL (ref 8.4–10.5)
CHLORIDE SERPL-SCNC: 106 MMOL/L (ref 98–110)
CHOLEST SERPL-MCNC: 125 MG/DL (ref 110–200)
CO2 SERPL-SCNC: 23 MMOL/L (ref 20–32)
CREAT SERPL-MCNC: 0.8 MG/DL (ref 0.8–1.6)
GFRAA, 66117: >60
GFRNA, 66118: >60
GLOBULIN,GLOB: 1.7 G/DL (ref 2–4)
GLUCOSE SERPL-MCNC: 151 MG/DL (ref 70–99)
HBA1C MFR BLD HPLC: 7 % (ref 4.8–5.6)
HDLC SERPL-MCNC: 3 MG/DL (ref 0–5)
HDLC SERPL-MCNC: 41 MG/DL
LDL/HDL RATIO,LDHD: 1.7
LDLC SERPL CALC-MCNC: 69 MG/DL (ref 50–99)
NON-HDL CHOLESTEROL, 011976: 84 MG/DL
POTASSIUM SERPL-SCNC: 4.1 MMOL/L (ref 3.5–5.5)
PROT SERPL-MCNC: 6.3 G/DL (ref 6.2–8.1)
SODIUM SERPL-SCNC: 142 MMOL/L (ref 133–145)
TRIGL SERPL-MCNC: 78 MG/DL (ref 40–149)
VLDLC SERPL CALC-MCNC: 16 MG/DL (ref 8–30)

## 2021-11-23 ENCOUNTER — OFFICE VISIT (OUTPATIENT)
Dept: FAMILY MEDICINE CLINIC | Age: 66
End: 2021-11-23
Payer: MEDICARE

## 2021-11-23 VITALS
BODY MASS INDEX: 32.34 KG/M2 | DIASTOLIC BLOOD PRESSURE: 78 MMHG | RESPIRATION RATE: 20 BRPM | HEIGHT: 72 IN | TEMPERATURE: 98.2 F | SYSTOLIC BLOOD PRESSURE: 130 MMHG | HEART RATE: 76 BPM | OXYGEN SATURATION: 96 % | WEIGHT: 238.8 LBS

## 2021-11-23 DIAGNOSIS — I10 ESSENTIAL HYPERTENSION: ICD-10-CM

## 2021-11-23 DIAGNOSIS — M35.3 PMR (POLYMYALGIA RHEUMATICA) (HCC): ICD-10-CM

## 2021-11-23 DIAGNOSIS — E78.2 MIXED HYPERLIPIDEMIA: ICD-10-CM

## 2021-11-23 DIAGNOSIS — E11.9 TYPE 2 DIABETES MELLITUS WITHOUT COMPLICATION, WITHOUT LONG-TERM CURRENT USE OF INSULIN (HCC): Primary | ICD-10-CM

## 2021-11-23 DIAGNOSIS — Z12.11 SCREEN FOR COLON CANCER: ICD-10-CM

## 2021-11-23 PROCEDURE — 3051F HG A1C>EQUAL 7.0%<8.0%: CPT | Performed by: NURSE PRACTITIONER

## 2021-11-23 PROCEDURE — 2022F DILAT RTA XM EVC RTNOPTHY: CPT | Performed by: NURSE PRACTITIONER

## 2021-11-23 PROCEDURE — G8417 CALC BMI ABV UP PARAM F/U: HCPCS | Performed by: NURSE PRACTITIONER

## 2021-11-23 PROCEDURE — G8754 DIAS BP LESS 90: HCPCS | Performed by: NURSE PRACTITIONER

## 2021-11-23 PROCEDURE — G8427 DOCREV CUR MEDS BY ELIG CLIN: HCPCS | Performed by: NURSE PRACTITIONER

## 2021-11-23 PROCEDURE — 1101F PT FALLS ASSESS-DOCD LE1/YR: CPT | Performed by: NURSE PRACTITIONER

## 2021-11-23 PROCEDURE — G8536 NO DOC ELDER MAL SCRN: HCPCS | Performed by: NURSE PRACTITIONER

## 2021-11-23 PROCEDURE — 99214 OFFICE O/P EST MOD 30 MIN: CPT | Performed by: NURSE PRACTITIONER

## 2021-11-23 PROCEDURE — G8432 DEP SCR NOT DOC, RNG: HCPCS | Performed by: NURSE PRACTITIONER

## 2021-11-23 PROCEDURE — 3017F COLORECTAL CA SCREEN DOC REV: CPT | Performed by: NURSE PRACTITIONER

## 2021-11-23 PROCEDURE — G8752 SYS BP LESS 140: HCPCS | Performed by: NURSE PRACTITIONER

## 2021-11-23 RX ORDER — SITAGLIPTIN AND METFORMIN HYDROCHLORIDE 50; 1000 MG/1; MG/1
TABLET, FILM COATED, EXTENDED RELEASE ORAL
Qty: 180 TABLET | Refills: 3 | Status: SHIPPED | OUTPATIENT
Start: 2021-11-23

## 2021-11-23 RX ORDER — ATORVASTATIN CALCIUM 40 MG/1
TABLET, FILM COATED ORAL
Qty: 90 TABLET | Refills: 3 | Status: SHIPPED | OUTPATIENT
Start: 2021-11-23

## 2021-11-23 RX ORDER — AMLODIPINE BESYLATE AND BENAZEPRIL HYDROCHLORIDE 10; 40 MG/1; MG/1
CAPSULE ORAL
Qty: 90 CAPSULE | Refills: 3 | Status: SHIPPED | OUTPATIENT
Start: 2021-11-23

## 2021-11-23 RX ORDER — BLOOD SUGAR DIAGNOSTIC
STRIP MISCELLANEOUS
Qty: 100 STRIP | Refills: 11 | Status: SHIPPED | OUTPATIENT
Start: 2021-11-23

## 2021-11-23 NOTE — PROGRESS NOTES
Office Note        Patient: Lele Nicolas     Subjective:     Lele Nicolas is a 73 y.o. WHITE/NON- male who was seen in clinic today (11/23/2021) for evaluation of No chief complaint on file. .      Diabetes control uncertain, Hypertension stable, Hyperlipidemia stable. Hypertension/HLD:    Diet and Lifestyle: generally follows a low sodium diet, follows a diabetic diet regularly  Home BP Monitoring: is not measured at home    Cardiovascular ROS: taking medications as instructed, no medication side effects noted, no TIA's, no chest pain on exertion, no dyspnea on exertion, no swelling of ankles. Lab Results   Component Value Date/Time    Sodium 142 11/19/2021 08:17 AM    Potassium 4.1 11/19/2021 08:17 AM    Chloride 106 11/19/2021 08:17 AM    CO2 23 11/19/2021 08:17 AM    Anion gap 13.0 11/19/2021 08:17 AM    Glucose 151 (H) 11/19/2021 08:17 AM    BUN 21 11/19/2021 08:17 AM    Creatinine 0.8 11/19/2021 08:17 AM    Calcium 9.6 11/19/2021 08:17 AM      Lab Results   Component Value Date/Time    WBC 8.7 11/27/2020 08:30 AM    HGB 12.9 (L) 11/27/2020 08:30 AM    HCT 40.4 11/27/2020 08:30 AM    PLATELET 308 98/15/2943 08:30 AM    MCV 91 11/27/2020 08:30 AM      Lab Results   Component Value Date/Time    Hemoglobin A1c 7.0 (H) 11/19/2021 08:17 AM    Hemoglobin A1c, External 7.2 07/10/2015 12:00 AM      Lab Results   Component Value Date/Time    Cholesterol, total 125 11/19/2021 08:17 AM    HDL Cholesterol 41 11/19/2021 08:17 AM    LDL, calculated 69 11/19/2021 08:17 AM    VLDL, calculated 16 11/19/2021 08:17 AM    Triglyceride 78 11/19/2021 08:17 AM        Key Antihyperlipidemia Meds             atorvastatin (LIPITOR) 40 mg tablet (Taking) TAKE 1 TABLET BY MOUTH EVERY DAY           New concerns: Pt is seeing Dr. Hyun Lynn for his Polymyalgia Rheumatica. Diabetes:    Since last visit, he  reports: no significant changes. He reports medication compliance: compliant most of the time. Medication side effects: none. Diabetic diet compliance: compliant most of the time   Activity level:moderately active or Walks 3 miles a daily    Home glucoses: Patient is not checking his blood sugars  Hypoglycemic episodes: N/A    Diabetic foot exam:     Left Foot:   Visual Exam: normal    Pulse DP: 2+ (normal)   Filament test: 4/6   Vibratory sensation: diminished      Right Foot:   Visual Exam: normal    Pulse DP: 2+ (normal)   Filament test: 4/6   Vibratory sensation: diminished      Diabetic Foot and Eye Exam  Status   Topic Date Due    Eye Exam  02/20/2021    Diabetic Foot Care  11/23/2022           Objective:     Visit Vitals  /78   Pulse 76   Temp 98.2 °F (36.8 °C)   Resp 20   Ht 5' 11.5\" (1.816 m)   Wt 238 lb 12.8 oz (108.3 kg)   SpO2 96%   BMI 32.84 kg/m²       Appearance: alert, well appearing, and in no distress and oriented to person, place, and time. Exam: heart sounds normal rate, regular rhythm, normal S1, S2, no murmurs, rubs, clicks or gallops, chest clear, no carotid bruits  Lab review: labs are reviewed, up to date. Assessment & Plan:     diabetes control uncertain, hypertension stable, hyperlipidemia stable. Diabetic issues reviewed with him: home glucose monitoring emphasized, foot care discussed and Podiatry visits discussed and annual eye examinations at Ophthalmology discussed. Hypertension issues reviewed with him: Continue current therapy         ICD-10-CM ICD-9-CM    1. Type 2 diabetes mellitus without complication, without long-term current use of insulin ([facility])  E11.9 250.00  DIABETES EDUCATION       DIABETES FOOT EXAM      MICROALBUMIN, UR, RAND W/ MICROALB/CREAT RATIO      glucose blood VI test strips (Accu-Chek [place] Plus test strp) strip      SITagliptin-metFORMIN (Janumet XR) 50-1,000 mg TM24      MICROALBUMIN, UR, RAND W/ MICROALB/CREAT RATIO   2. Essential hypertension  I10 401.9 amLODIPine-benazepril (LOTREL) 10-40 mg per capsule   3.  Mixed hyperlipidemia  E78.2 272.2 atorvastatin (LIPITOR) 40 mg tablet   4. PMR (polymyalgia rheumatica) ([facility])  M35.3 725    5. Screen for colon cancer  Z12.11 V76.51 REFERRAL FOR COLONOSCOPY         Follow-up and Dispositions    · Return in about 6 months (around 5/23/2022) for Hypertension, Diabetes, High Cholesterol, (In Office), Labs 1 Week Prior. Disclaimer:    I have discussed the diagnosis with the patient and the intended plan as seen above. The patient understands our medical plan. The risks, benefits and significant side effects of all medications have been reviewed. Anticipated time course and progression of condition reviewed. All questions have been addressed. He received an after visit summary, with information reviewed, and questions answered. Where appropriate, he is instructed to call the clinic if he has not been notified either by phone or through [address] with the results of his tests or with an appointment plan for any referrals within 1 week(s). The patient  is to call if his condition worsens or fails to improve or if significant side effects are experienced.        Robb Cason NP

## 2021-11-24 LAB
CREATININE, URINE: 239 MG/DL
MICROALB/CREAT RATIO, 140286: 11.7 (ref 0–30)
MICROALBUMIN,URINE RANDOM 140054: 28 MG/L (ref 0.1–17)

## 2022-07-18 ENCOUNTER — TELEPHONE (OUTPATIENT)
Dept: FAMILY MEDICINE CLINIC | Age: 67
End: 2022-07-18

## 2022-07-18 NOTE — TELEPHONE ENCOUNTER
Left message for patient to schedule appointment      Hypertension, Diabetes, High Cholesterol, (In Office),

## 2022-11-24 ENCOUNTER — TELEPHONE (OUTPATIENT)
Dept: FAMILY MEDICINE CLINIC | Age: 67
End: 2022-11-24

## 2022-11-24 DIAGNOSIS — E11.9 TYPE 2 DIABETES MELLITUS WITHOUT COMPLICATION, WITHOUT LONG-TERM CURRENT USE OF INSULIN (HCC): ICD-10-CM

## 2022-11-28 RX ORDER — SITAGLIPTIN AND METFORMIN HYDROCHLORIDE 50; 1000 MG/1; MG/1
TABLET, FILM COATED, EXTENDED RELEASE ORAL
Qty: 180 TABLET | Refills: 3 | OUTPATIENT
Start: 2022-11-28

## 2022-11-29 NOTE — TELEPHONE ENCOUNTER
Called patient to schedule appointment. States he has a different provider. No longer apart of the practice.

## 2023-01-03 DIAGNOSIS — I10 ESSENTIAL HYPERTENSION: ICD-10-CM

## 2023-01-03 RX ORDER — AMLODIPINE BESYLATE AND BENAZEPRIL HYDROCHLORIDE 10; 40 MG/1; MG/1
CAPSULE ORAL
Qty: 90 CAPSULE | Refills: 3 | OUTPATIENT
Start: 2023-01-03